# Patient Record
Sex: FEMALE | Race: WHITE | NOT HISPANIC OR LATINO
[De-identification: names, ages, dates, MRNs, and addresses within clinical notes are randomized per-mention and may not be internally consistent; named-entity substitution may affect disease eponyms.]

---

## 2017-09-20 PROBLEM — Z00.00 ENCOUNTER FOR PREVENTIVE HEALTH EXAMINATION: Status: ACTIVE | Noted: 2017-09-20

## 2017-10-16 ENCOUNTER — APPOINTMENT (OUTPATIENT)
Dept: INTERNAL MEDICINE | Facility: CLINIC | Age: 27
End: 2017-10-16

## 2018-10-09 ENCOUNTER — APPOINTMENT (OUTPATIENT)
Dept: ALLERGY | Facility: CLINIC | Age: 28
End: 2018-10-09
Payer: MEDICAID

## 2018-10-09 VITALS
SYSTOLIC BLOOD PRESSURE: 110 MMHG | HEART RATE: 80 BPM | HEIGHT: 65 IN | RESPIRATION RATE: 14 BRPM | BODY MASS INDEX: 22.49 KG/M2 | DIASTOLIC BLOOD PRESSURE: 80 MMHG | WEIGHT: 135 LBS

## 2018-10-09 DIAGNOSIS — Z87.09 PERSONAL HISTORY OF OTHER DISEASES OF THE RESPIRATORY SYSTEM: ICD-10-CM

## 2018-10-09 PROCEDURE — 95004 PERQ TESTS W/ALRGNC XTRCS: CPT

## 2018-10-09 PROCEDURE — 99070 SPECIAL SUPPLIES PHYS/QHP: CPT

## 2018-10-09 PROCEDURE — 95018 ALL TSTG PERQ&IQ DRUGS/BIOL: CPT

## 2018-10-09 PROCEDURE — 94060 EVALUATION OF WHEEZING: CPT

## 2018-10-09 PROCEDURE — 99204 OFFICE O/P NEW MOD 45 MIN: CPT | Mod: 25

## 2018-10-15 ENCOUNTER — APPOINTMENT (OUTPATIENT)
Dept: ALLERGY | Facility: CLINIC | Age: 28
End: 2018-10-15
Payer: MEDICAID

## 2018-10-15 PROCEDURE — 99214 OFFICE O/P EST MOD 30 MIN: CPT | Mod: 25

## 2018-10-15 PROCEDURE — 31231 NASAL ENDOSCOPY DX: CPT

## 2018-10-23 ENCOUNTER — APPOINTMENT (OUTPATIENT)
Dept: ALLERGY | Facility: CLINIC | Age: 28
End: 2018-10-23
Payer: MEDICAID

## 2018-10-23 VITALS
SYSTOLIC BLOOD PRESSURE: 110 MMHG | HEART RATE: 72 BPM | WEIGHT: 135 LBS | BODY MASS INDEX: 22.49 KG/M2 | RESPIRATION RATE: 4 BRPM | DIASTOLIC BLOOD PRESSURE: 80 MMHG | HEIGHT: 65 IN

## 2018-10-23 PROCEDURE — 95018 ALL TSTG PERQ&IQ DRUGS/BIOL: CPT

## 2018-10-23 PROCEDURE — 99213 OFFICE O/P EST LOW 20 MIN: CPT | Mod: 25

## 2018-10-23 PROCEDURE — 95024 IQ TESTS W/ALLERGENIC XTRCS: CPT

## 2020-12-16 PROBLEM — Z87.09 HISTORY OF ACUTE SINUSITIS: Status: RESOLVED | Noted: 2018-10-09 | Resolved: 2020-12-16

## 2022-03-15 ENCOUNTER — RESULT REVIEW (OUTPATIENT)
Age: 32
End: 2022-03-15

## 2022-03-16 DIAGNOSIS — O09.90 SUPERVISION OF HIGH RISK PREGNANCY, UNSPECIFIED, UNSPECIFIED TRIMESTER: ICD-10-CM

## 2022-03-17 ENCOUNTER — APPOINTMENT (OUTPATIENT)
Dept: ANTEPARTUM | Facility: CLINIC | Age: 32
End: 2022-03-17

## 2022-03-17 ENCOUNTER — ASOB RESULT (OUTPATIENT)
Age: 32
End: 2022-03-17

## 2022-03-17 ENCOUNTER — APPOINTMENT (OUTPATIENT)
Dept: MATERNAL FETAL MEDICINE | Facility: CLINIC | Age: 32
End: 2022-03-17

## 2022-03-17 ENCOUNTER — APPOINTMENT (OUTPATIENT)
Dept: MATERNAL FETAL MEDICINE | Facility: CLINIC | Age: 32
End: 2022-03-17
Payer: COMMERCIAL

## 2022-03-17 VITALS
DIASTOLIC BLOOD PRESSURE: 65 MMHG | OXYGEN SATURATION: 98 % | WEIGHT: 150.38 LBS | SYSTOLIC BLOOD PRESSURE: 105 MMHG | HEART RATE: 92 BPM | BODY MASS INDEX: 25.05 KG/M2 | HEIGHT: 65 IN

## 2022-03-17 PROCEDURE — 99204 OFFICE O/P NEW MOD 45 MIN: CPT

## 2022-03-22 LAB
ANA SER IF-ACNC: NEGATIVE
ANION GAP SERPL CALC-SCNC: 16 MMOL/L
BASOPHILS # BLD AUTO: 0.05 K/UL
BASOPHILS NFR BLD AUTO: 0.8 %
BUN SERPL-MCNC: 8 MG/DL
CALCIUM SERPL-MCNC: 9.6 MG/DL
CHLORIDE SERPL-SCNC: 103 MMOL/L
CO2 SERPL-SCNC: 22 MMOL/L
CREAT SERPL-MCNC: 0.6 MG/DL
DSDNA AB SER-ACNC: <12 IU/ML
EGFR: 123 ML/MIN/1.73M2
ENA SS-A AB SER IA-ACNC: <0.2 AL
ENA SS-B AB SER IA-ACNC: <0.2 AL
EOSINOPHIL # BLD AUTO: 0.16 K/UL
EOSINOPHIL NFR BLD AUTO: 2.4 %
GLUCOSE SERPL-MCNC: 65 MG/DL
HCT VFR BLD CALC: 34 %
HGB BLD-MCNC: 10.8 G/DL
IMM GRANULOCYTES NFR BLD AUTO: 0.3 %
LYMPHOCYTES # BLD AUTO: 1.65 K/UL
LYMPHOCYTES NFR BLD AUTO: 24.8 %
MAN DIFF?: NORMAL
MCHC RBC-ENTMCNC: 28.6 PG
MCHC RBC-ENTMCNC: 31.8 GM/DL
MCV RBC AUTO: 89.9 FL
MONOCYTES # BLD AUTO: 0.61 K/UL
MONOCYTES NFR BLD AUTO: 9.2 %
NEUTROPHILS # BLD AUTO: 4.16 K/UL
NEUTROPHILS NFR BLD AUTO: 62.5 %
PLATELET # BLD AUTO: 233 K/UL
POTASSIUM SERPL-SCNC: 4 MMOL/L
RBC # BLD: 3.78 M/UL
RBC # FLD: 13.2 %
RHEUMATOID FACT SER QL: <10 IU/ML
SODIUM SERPL-SCNC: 140 MMOL/L
TSI ACT/NOR SER: <0.1 IU/L
WBC # FLD AUTO: 6.65 K/UL

## 2022-04-11 LAB
ALBUMIN SERPL ELPH-MCNC: 4.3 G/DL
ALP BLD-CCNC: 71 U/L
ALT SERPL-CCNC: 9 U/L
ANION GAP SERPL CALC-SCNC: 13 MMOL/L
AST SERPL-CCNC: 12 U/L
BILIRUB SERPL-MCNC: 0.3 MG/DL
BUN SERPL-MCNC: 6 MG/DL
CALCIUM SERPL-MCNC: 9.4 MG/DL
CHLORIDE SERPL-SCNC: 102 MMOL/L
CO2 SERPL-SCNC: 21 MMOL/L
CREAT SERPL-MCNC: 0.63 MG/DL
EGFR: 122 ML/MIN/1.73M2
GLUCOSE SERPL-MCNC: 154 MG/DL
POTASSIUM SERPL-SCNC: 3.3 MMOL/L
PROT SERPL-MCNC: 6.9 G/DL
SODIUM SERPL-SCNC: 135 MMOL/L

## 2022-04-28 ENCOUNTER — APPOINTMENT (OUTPATIENT)
Dept: MATERNAL FETAL MEDICINE | Facility: CLINIC | Age: 32
End: 2022-04-28
Payer: COMMERCIAL

## 2022-04-28 ENCOUNTER — ASOB RESULT (OUTPATIENT)
Age: 32
End: 2022-04-28

## 2022-04-28 PROCEDURE — 99214 OFFICE O/P EST MOD 30 MIN: CPT | Mod: 95

## 2022-05-10 LAB
ALBUMIN SERPL ELPH-MCNC: 4.3 G/DL
ALP BLD-CCNC: 70 U/L
ALT SERPL-CCNC: 9 U/L
ANION GAP SERPL CALC-SCNC: 12 MMOL/L
AST SERPL-CCNC: 12 U/L
BILIRUB SERPL-MCNC: 0.2 MG/DL
BUN SERPL-MCNC: 9 MG/DL
CALCIUM SERPL-MCNC: 9.5 MG/DL
CHLORIDE SERPL-SCNC: 102 MMOL/L
CO2 SERPL-SCNC: 22 MMOL/L
CREAT SERPL-MCNC: 0.54 MG/DL
EGFR: 126 ML/MIN/1.73M2
GLUCOSE SERPL-MCNC: 84 MG/DL
POTASSIUM SERPL-SCNC: 3.9 MMOL/L
PROT SERPL-MCNC: 6.6 G/DL
SODIUM SERPL-SCNC: 137 MMOL/L

## 2022-06-09 ENCOUNTER — ASOB RESULT (OUTPATIENT)
Age: 32
End: 2022-06-09

## 2022-06-09 ENCOUNTER — APPOINTMENT (OUTPATIENT)
Dept: MATERNAL FETAL MEDICINE | Facility: CLINIC | Age: 32
End: 2022-06-09
Payer: COMMERCIAL

## 2022-06-09 ENCOUNTER — APPOINTMENT (OUTPATIENT)
Dept: ANTEPARTUM | Facility: CLINIC | Age: 32
End: 2022-06-09
Payer: COMMERCIAL

## 2022-06-09 VITALS
DIASTOLIC BLOOD PRESSURE: 77 MMHG | WEIGHT: 159 LBS | HEIGHT: 65 IN | SYSTOLIC BLOOD PRESSURE: 110 MMHG | BODY MASS INDEX: 26.49 KG/M2 | HEART RATE: 95 BPM

## 2022-06-09 PROCEDURE — 99213 OFFICE O/P EST LOW 20 MIN: CPT

## 2022-06-09 PROCEDURE — 76811 OB US DETAILED SNGL FETUS: CPT

## 2022-09-13 ENCOUNTER — OUTPATIENT (OUTPATIENT)
Dept: OUTPATIENT SERVICES | Facility: HOSPITAL | Age: 32
LOS: 1 days | End: 2022-09-13
Payer: COMMERCIAL

## 2022-09-13 VITALS — HEART RATE: 96 BPM | DIASTOLIC BLOOD PRESSURE: 73 MMHG | SYSTOLIC BLOOD PRESSURE: 122 MMHG

## 2022-09-13 VITALS — OXYGEN SATURATION: 97 %

## 2022-09-13 DIAGNOSIS — O26.899 OTHER SPECIFIED PREGNANCY RELATED CONDITIONS, UNSPECIFIED TRIMESTER: ICD-10-CM

## 2022-09-13 DIAGNOSIS — Z3A.00 WEEKS OF GESTATION OF PREGNANCY NOT SPECIFIED: ICD-10-CM

## 2022-09-13 LAB
APPEARANCE UR: CLEAR — SIGNIFICANT CHANGE UP
BACTERIA # UR AUTO: NEGATIVE — SIGNIFICANT CHANGE UP
BILIRUB UR-MCNC: NEGATIVE — SIGNIFICANT CHANGE UP
COLOR SPEC: SIGNIFICANT CHANGE UP
DIFF PNL FLD: NEGATIVE — SIGNIFICANT CHANGE UP
EPI CELLS # UR: 1 /HPF — SIGNIFICANT CHANGE UP
GLUCOSE UR QL: NEGATIVE — SIGNIFICANT CHANGE UP
HYALINE CASTS # UR AUTO: 0 /LPF — SIGNIFICANT CHANGE UP (ref 0–2)
KETONES UR-MCNC: NEGATIVE — SIGNIFICANT CHANGE UP
LEUKOCYTE ESTERASE UR-ACNC: NEGATIVE — SIGNIFICANT CHANGE UP
NITRITE UR-MCNC: NEGATIVE — SIGNIFICANT CHANGE UP
PH UR: 7 — SIGNIFICANT CHANGE UP (ref 5–8)
PROT UR-MCNC: NEGATIVE — SIGNIFICANT CHANGE UP
RBC CASTS # UR COMP ASSIST: 1 /HPF — SIGNIFICANT CHANGE UP (ref 0–4)
SP GR SPEC: 1.01 — LOW (ref 1.01–1.02)
UROBILINOGEN FLD QL: NEGATIVE — SIGNIFICANT CHANGE UP
WBC UR QL: 1 /HPF — SIGNIFICANT CHANGE UP (ref 0–5)

## 2022-09-13 PROCEDURE — 87086 URINE CULTURE/COLONY COUNT: CPT

## 2022-09-13 PROCEDURE — 81001 URINALYSIS AUTO W/SCOPE: CPT

## 2022-09-13 PROCEDURE — G0463: CPT

## 2022-09-13 PROCEDURE — 59025 FETAL NON-STRESS TEST: CPT

## 2022-09-13 NOTE — OB RN TRIAGE NOTE - FALL HARM RISK - UNIVERSAL INTERVENTIONS
Bed in lowest position, wheels locked, appropriate side rails in place/Call bell, personal items and telephone in reach/Instruct patient to call for assistance before getting out of bed or chair/Non-slip footwear when patient is out of bed/[place] to call system/Physically safe environment - no spills, clutter or unnecessary equipment/Purposeful Proactive Rounding/Room/bathroom lighting operational, light cord in reach

## 2022-09-13 NOTE — OB PROVIDER TRIAGE NOTE - ADDITIONAL INSTRUCTIONS
The patient was instructed to contact her MD for consistent contractions with increasing pain.  Additionally, the patient was instructed to contact her MD for leakage of fluids, vaginal bleeding, or decreased fetal movement. The patient has an appt with Dr. Gomez on 9/15. The patient was seen and evaluated by Dr. Gomez

## 2022-09-13 NOTE — CHART NOTE - NSCHARTNOTEFT_GEN_A_CORE
NP Chart Note:    The patient was re-examined after 3 hrs and found to be closed/long/-3. The patient reports continued cramping pain with no change in intensity. The urinalysis is negative for a UTI. Urine culture is pending. The patient to be discharged with labor precautions.     d/w Dr. Jason Lee NP

## 2022-09-13 NOTE — OB RN TRIAGE NOTE - FALL HARM RISK - TYPE OF ASSESSMENT
Thank you. Yes, it was me that called her at [phone], but no one answered and voice mail had not yet been set up on the account. Daily Assessment

## 2022-09-13 NOTE — OB PROVIDER TRIAGE NOTE - NS ATTEND AMEND GEN_ALL_CORE FT
patient discussed with PA and seen at bedside. 34.2 w overall benign prenatal course here for irregular ctx. pt appears comfortable in bed, feels ctx have improved. continues to hydrate. exam C/L/P x 2 over 3+hrs with no change. ctx improving. comfortable. dc home with precautions. f/u as scheduled 9/15.

## 2022-09-13 NOTE — OB PROVIDER TRIAGE NOTE - NSHPPHYSICALEXAM_GEN_ALL_CORE
ICU Vital Signs Last 24 Hrs  T(C): 36.8 (13 Sep 2022 10:11), Max: 36.8 (13 Sep 2022 10:11)  T(F): 98.24 (13 Sep 2022 10:11), Max: 98.24 (13 Sep 2022 10:11)  HR: 80 (13 Sep 2022 11:19) (76 - 86)  BP: 107/67 (13 Sep 2022 10:53) (107/67 - 107/67)  BP(mean): --  ABP: --  ABP(mean): --  RR: 18 (13 Sep 2022 10:53) (16 - 18)  SpO2: 99% (13 Sep 2022 11:19) (97% - 100%)    gen: A&Ox3  CV: rrr s1s2  abd: gravid soft  VE: closed/long/-3 intact  EFM: 125 moderate variability, + acels, -decels  toco: none ICU Vital Signs Last 24 Hrs  T(C): 36.8 (13 Sep 2022 10:11), Max: 36.8 (13 Sep 2022 10:11)  T(F): 98.24 (13 Sep 2022 10:11), Max: 98.24 (13 Sep 2022 10:11)  HR: 80 (13 Sep 2022 11:19) (76 - 86)  BP: 107/67 (13 Sep 2022 10:53) (107/67 - 107/67)  BP(mean): --  ABP: --  ABP(mean): --  RR: 18 (13 Sep 2022 10:53) (16 - 18)  SpO2: 99% (13 Sep 2022 11:19) (97% - 100%)    gen: A&Ox3  CV: rrr s1s2  abd: gravid soft  VE: closed/long/-3 intact  EFM: 125 moderate variability, + acels, -decels  toco: none, however toco adjusted ICU Vital Signs Last 24 Hrs  T(C): 36.8 (13 Sep 2022 10:11), Max: 36.8 (13 Sep 2022 10:11)  T(F): 98.24 (13 Sep 2022 10:11), Max: 98.24 (13 Sep 2022 10:11)  HR: 80 (13 Sep 2022 11:19) (76 - 86)  BP: 107/67 (13 Sep 2022 10:53) (107/67 - 107/67)  BP(mean): --  ABP: --  ABP(mean): --  RR: 18 (13 Sep 2022 10:53) (16 - 18)  SpO2: 99% (13 Sep 2022 11:19) (97% - 100%)    gen: A&Ox3  CV: rrr s1s2  abd: gravid soft  VE: closed/long/-3 intact  EFM: 125 moderate variability, + acels, -decels  toco: none, however toco adjusted  bedside sonogram: cephalic presentation

## 2022-09-13 NOTE — OB PROVIDER TRIAGE NOTE - HISTORY OF PRESENT ILLNESS
The patient is a 33 y/o  EDC 10/23/2022 @ 34.2 weeks presenting to L&D with cramping pain [id number] overnight, however the patient reports intermittent cramping pain x1wk. In addition to cramping pain, the patient reports sharp cervical pain 5x overnight. The patient denies LOF, VB. endorses good fetal movement. The patient accepts all blood products    allergies: nkda  meds: PNV    Medhx: pt denies cardiac, pulm, heme, GI, neuro  OBhx: current  Sxhx: pt denies  Gynhx: +HSV no current outbreaks, denies cysts, fibroids, abn. pap  Sochx: pt denies  Famhx: pt denies

## 2022-09-13 NOTE — OB PROVIDER TRIAGE NOTE - NSOBPROVIDERNOTE_OBGYN_ALL_OB_FT
33 y/o  @ 34.2 weeks presenting to L&D with cramping pain likely Glasscock-Anurag  -EFM/toco  -U/a and C&S    d/w Dr. Jason Lee NP 33 y/o  @ 34.2 weeks presenting to L&D with cramping pain likely Lamb-Osborn  -EFM/toco  -U/a and C&S  -PO hydration  -repeat VE 2 hrs from previous exam    d/w Dr. Jason Lee NP

## 2022-09-14 LAB
CULTURE RESULTS: SIGNIFICANT CHANGE UP
SPECIMEN SOURCE: SIGNIFICANT CHANGE UP

## 2022-09-21 ENCOUNTER — OUTPATIENT (OUTPATIENT)
Dept: OUTPATIENT SERVICES | Facility: HOSPITAL | Age: 32
LOS: 1 days | End: 2022-09-21
Payer: COMMERCIAL

## 2022-09-21 VITALS — TEMPERATURE: 98 F

## 2022-09-21 VITALS — SYSTOLIC BLOOD PRESSURE: 118 MMHG | DIASTOLIC BLOOD PRESSURE: 69 MMHG | HEART RATE: 73 BPM

## 2022-09-21 DIAGNOSIS — Z3A.00 WEEKS OF GESTATION OF PREGNANCY NOT SPECIFIED: ICD-10-CM

## 2022-09-21 DIAGNOSIS — O26.899 OTHER SPECIFIED PREGNANCY RELATED CONDITIONS, UNSPECIFIED TRIMESTER: ICD-10-CM

## 2022-09-21 LAB
APTT BLD: 26.6 SEC — LOW (ref 27.5–35.5)
BLD GP AB SCN SERPL QL: POSITIVE — SIGNIFICANT CHANGE UP
FIBRINOGEN PPP-MCNC: 678 MG/DL — HIGH (ref 330–520)
HCT VFR BLD CALC: 36 % — SIGNIFICANT CHANGE UP (ref 34.5–45)
HGB BLD-MCNC: 11.8 G/DL — SIGNIFICANT CHANGE UP (ref 11.5–15.5)
INR BLD: 1.01 RATIO — SIGNIFICANT CHANGE UP (ref 0.88–1.16)
KLEIHAUER-BETKE CALCULATION: 0 % — SIGNIFICANT CHANGE UP (ref 0–0.3)
MCHC RBC-ENTMCNC: 27.7 PG — SIGNIFICANT CHANGE UP (ref 27–34)
MCHC RBC-ENTMCNC: 32.8 GM/DL — SIGNIFICANT CHANGE UP (ref 32–36)
MCV RBC AUTO: 84.5 FL — SIGNIFICANT CHANGE UP (ref 80–100)
NRBC # BLD: 0 /100 WBCS — SIGNIFICANT CHANGE UP (ref 0–0)
PLATELET # BLD AUTO: 198 K/UL — SIGNIFICANT CHANGE UP (ref 150–400)
PROTHROM AB SERPL-ACNC: 11.7 SEC — SIGNIFICANT CHANGE UP (ref 10.5–13.4)
RBC # BLD: 4.26 M/UL — SIGNIFICANT CHANGE UP (ref 3.8–5.2)
RBC # FLD: 14.4 % — SIGNIFICANT CHANGE UP (ref 10.3–14.5)
RH IG SCN BLD-IMP: NEGATIVE — SIGNIFICANT CHANGE UP
RH IG SCN BLD-IMP: NEGATIVE — SIGNIFICANT CHANGE UP
WBC # BLD: 10.53 K/UL — HIGH (ref 3.8–10.5)
WBC # FLD AUTO: 10.53 K/UL — HIGH (ref 3.8–10.5)

## 2022-09-21 PROCEDURE — 85730 THROMBOPLASTIN TIME PARTIAL: CPT

## 2022-09-21 PROCEDURE — 86870 RBC ANTIBODY IDENTIFICATION: CPT

## 2022-09-21 PROCEDURE — G0463: CPT

## 2022-09-21 PROCEDURE — 85610 PROTHROMBIN TIME: CPT

## 2022-09-21 PROCEDURE — 85384 FIBRINOGEN ACTIVITY: CPT

## 2022-09-21 PROCEDURE — 59025 FETAL NON-STRESS TEST: CPT

## 2022-09-21 PROCEDURE — 85460 HEMOGLOBIN FETAL: CPT

## 2022-09-21 PROCEDURE — 86850 RBC ANTIBODY SCREEN: CPT

## 2022-09-21 PROCEDURE — 86077 PHYS BLOOD BANK SERV XMATCH: CPT

## 2022-09-21 PROCEDURE — 86901 BLOOD TYPING SEROLOGIC RH(D): CPT

## 2022-09-21 PROCEDURE — 85027 COMPLETE CBC AUTOMATED: CPT

## 2022-09-21 PROCEDURE — 36415 COLL VENOUS BLD VENIPUNCTURE: CPT

## 2022-09-21 PROCEDURE — 86900 BLOOD TYPING SEROLOGIC ABO: CPT

## 2022-09-21 RX ORDER — SODIUM CHLORIDE 9 MG/ML
1000 INJECTION, SOLUTION INTRAVENOUS
Refills: 0 | Status: DISCONTINUED | OUTPATIENT
Start: 2022-09-21 | End: 2022-10-05
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
## 2022-09-21 NOTE — OB PROVIDER TRIAGE NOTE - NSHPPHYSICALEXAM_GEN_ALL_CORE
Vitals:  Vital Signs Last 24 Hrs  T(C): 36.8 (21 Sep 2022 11:08), Max: 36.8 (21 Sep 2022 11:01)  T(F): 98.2 (21 Sep 2022 11:08), Max: 98.24 (21 Sep 2022 11:01)  HR: 88 (21 Sep 2022 11:48) (80 - 97)  BP: 115/55 (21 Sep 2022 11:08) (115/55 - 115/55)  BP(mean): --  RR: 16 (21 Sep 2022 11:08) (16 - 16)  SpO2: 97% (21 Sep 2022 11:48) (94% - 98%)    PE  General: WDWN gravid female in NAD  CV: RRR  Resp: breathing comfortably on room air  Abdomen: soft, nontender, gravid, no erythema or bruising noted  SVE: closed/thick/high, no bleeding noted  [place]: not hilary, irritability noted  FHT: 135bpm, mod variability, +accel, -decel
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
## 2022-09-21 NOTE — OB RN TRIAGE NOTE - FALL HARM RISK - UNIVERSAL INTERVENTIONS
Bed in lowest position, wheels locked, appropriate side rails in place/Call bell, personal items and telephone in reach/Instruct patient to call for assistance before getting out of bed or chair/Non-slip footwear when patient is out of bed/[place] to call system/Physically safe environment - no spills, clutter or unnecessary equipment/Purposeful Proactive Rounding/Room/bathroom lighting operational, light cord in reach

## 2022-10-27 ENCOUNTER — INPATIENT (INPATIENT)
Facility: HOSPITAL | Age: 32
LOS: 2 days | Discharge: HOME | End: 2022-10-30
Attending: OBSTETRICS & GYNECOLOGY | Admitting: OBSTETRICS & GYNECOLOGY
Payer: COMMERCIAL

## 2022-10-27 ENCOUNTER — OUTPATIENT (OUTPATIENT)
Dept: OUTPATIENT SERVICES | Facility: HOSPITAL | Age: 32
LOS: 1 days | End: 2022-10-27
Payer: COMMERCIAL

## 2022-10-27 VITALS
HEART RATE: 82 BPM | SYSTOLIC BLOOD PRESSURE: 123 MMHG | HEIGHT: 65 IN | WEIGHT: 188.94 LBS | RESPIRATION RATE: 16 BRPM | DIASTOLIC BLOOD PRESSURE: 87 MMHG | OXYGEN SATURATION: 100 % | TEMPERATURE: 98 F

## 2022-10-27 DIAGNOSIS — Z11.52 ENCOUNTER FOR SCREENING FOR COVID-19: ICD-10-CM

## 2022-10-27 PROBLEM — Z86.39 PERSONAL HISTORY OF OTHER ENDOCRINE, NUTRITIONAL AND METABOLIC DISEASE: Chronic | Status: ACTIVE | Noted: 2022-09-21

## 2022-10-27 PROBLEM — B00.9 HERPESVIRAL INFECTION, UNSPECIFIED: Chronic | Status: ACTIVE | Noted: 2022-09-21

## 2022-10-27 LAB
BASOPHILS # BLD AUTO: 0.06 K/UL — SIGNIFICANT CHANGE UP (ref 0–0.2)
BASOPHILS NFR BLD AUTO: 0.6 % — SIGNIFICANT CHANGE UP (ref 0–2)
BLD GP AB SCN SERPL QL: POSITIVE — SIGNIFICANT CHANGE UP
COVID-19 SPIKE DOMAIN AB INTERP: POSITIVE
COVID-19 SPIKE DOMAIN ANTIBODY RESULT: >250 U/ML — HIGH
EOSINOPHIL # BLD AUTO: 0.15 K/UL — SIGNIFICANT CHANGE UP (ref 0–0.5)
EOSINOPHIL NFR BLD AUTO: 1.6 % — SIGNIFICANT CHANGE UP (ref 0–6)
HCT VFR BLD CALC: 36.9 % — SIGNIFICANT CHANGE UP (ref 34.5–45)
HGB BLD-MCNC: 12.2 G/DL — SIGNIFICANT CHANGE UP (ref 11.5–15.5)
IMM GRANULOCYTES NFR BLD AUTO: 0.6 % — SIGNIFICANT CHANGE UP (ref 0–0.9)
LYMPHOCYTES # BLD AUTO: 1.14 K/UL — SIGNIFICANT CHANGE UP (ref 1–3.3)
LYMPHOCYTES # BLD AUTO: 12 % — LOW (ref 13–44)
MCHC RBC-ENTMCNC: 28.4 PG — SIGNIFICANT CHANGE UP (ref 27–34)
MCHC RBC-ENTMCNC: 33.1 GM/DL — SIGNIFICANT CHANGE UP (ref 32–36)
MCV RBC AUTO: 86 FL — SIGNIFICANT CHANGE UP (ref 80–100)
MONOCYTES # BLD AUTO: 0.65 K/UL — SIGNIFICANT CHANGE UP (ref 0–0.9)
MONOCYTES NFR BLD AUTO: 6.9 % — SIGNIFICANT CHANGE UP (ref 2–14)
NEUTROPHILS # BLD AUTO: 7.41 K/UL — HIGH (ref 1.8–7.4)
NEUTROPHILS NFR BLD AUTO: 78.3 % — HIGH (ref 43–77)
NRBC # BLD: 0 /100 WBCS — SIGNIFICANT CHANGE UP (ref 0–0)
PLATELET # BLD AUTO: 208 K/UL — SIGNIFICANT CHANGE UP (ref 150–400)
RBC # BLD: 4.29 M/UL — SIGNIFICANT CHANGE UP (ref 3.8–5.2)
RBC # FLD: 15.6 % — HIGH (ref 10.3–14.5)
RH IG SCN BLD-IMP: NEGATIVE — SIGNIFICANT CHANGE UP
SARS-COV-2 IGG+IGM SERPL QL IA: >250 U/ML — HIGH
SARS-COV-2 IGG+IGM SERPL QL IA: POSITIVE
SARS-COV-2 RNA SPEC QL NAA+PROBE: SIGNIFICANT CHANGE UP
WBC # BLD: 9.47 K/UL — SIGNIFICANT CHANGE UP (ref 3.8–10.5)
WBC # FLD AUTO: 9.47 K/UL — SIGNIFICANT CHANGE UP (ref 3.8–10.5)

## 2022-10-27 PROCEDURE — C9803: CPT

## 2022-10-27 PROCEDURE — 86077 PHYS BLOOD BANK SERV XMATCH: CPT

## 2022-10-27 PROCEDURE — U0003: CPT

## 2022-10-27 PROCEDURE — U0005: CPT

## 2022-10-27 RX ORDER — CHLORHEXIDINE GLUCONATE 213 G/1000ML
1 SOLUTION TOPICAL ONCE
Refills: 0 | Status: DISCONTINUED | OUTPATIENT
Start: 2022-10-27 | End: 2022-10-28

## 2022-10-27 RX ORDER — OXYTOCIN 10 UNIT/ML
333.33 VIAL (ML) INJECTION
Qty: 20 | Refills: 0 | Status: DISCONTINUED | OUTPATIENT
Start: 2022-10-27 | End: 2022-10-30

## 2022-10-27 RX ORDER — CITRIC ACID/SODIUM CITRATE 300-500 MG
15 SOLUTION, ORAL ORAL EVERY 6 HOURS
Refills: 0 | Status: DISCONTINUED | OUTPATIENT
Start: 2022-10-27 | End: 2022-10-28

## 2022-10-27 RX ORDER — SODIUM CHLORIDE 9 MG/ML
1000 INJECTION, SOLUTION INTRAVENOUS
Refills: 0 | Status: DISCONTINUED | OUTPATIENT
Start: 2022-10-27 | End: 2022-10-28
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
## 2022-10-27 NOTE — OB RN PATIENT PROFILE - FALL HARM RISK - UNIVERSAL INTERVENTIONS
Bed in lowest position, wheels locked, appropriate side rails in place/Call bell, personal items and telephone in reach/Instruct patient to call for assistance before getting out of bed or chair/Non-slip footwear when patient is out of bed/[place] to call system/Physically safe environment - no spills, clutter or unnecessary equipment/Purposeful Proactive Rounding/Room/bathroom lighting operational, light cord in reach

## 2022-10-27 NOTE — OB PROVIDER H&P - ASSESSMENT
Assessment  33yo  at 40w4d presents for NRNST and IOL.     Plan  - Admit to LND. Routine Labs. IVF.  - IOL w/ buccal cytotec  - Fetus: reassuring FHT. VTX. EFW 4026g by sono. Continuous EFM. Sono. No concerns.  - Prenatal issues: none  - GBS neg  - Pain: epidural PRN    Seen and d/w Dr. PATRICK Alcala, PGY3  Mariela Hitchcock, MS3 Assessment  31yo  at 40w4d presents for NRNST and IOL.     Plan  - Admit to LND. Routine Labs. IVF.  - IOL w/ buccal cytotec  - Fetus: reassuring FHT. VTX. EFW 4026g by sono. Continuous EFM. Sono. No concerns.  - Prenatal issues: none  - GBS neg  - Pain: epidural PRN    D/W Dr. Santo  Seen and d/w Dr. PATRICK Alcala, PGY3  Mariela Hitchcock, MS3

## 2022-10-27 NOTE — OB PROVIDER H&P - NSHPPHYSICALEXAM_GEN_ALL_CORE
Objective  VS  T(C): 36.5 (10-27-22 @ 17:44)  HR: 92 (10-27-22 @ 18:02)  BP: 123/87 (10-27-22 @ 17:44)  RR: 16 (10-27-22 @ 17:44)  SpO2: 88% (10-27-22 @ 18:02)    PE:   CV: clinically well perfused  Pulm: breathing comfortably on RA  Abd: gravid, nontender  Extr: moving all extremities with ease     Spec: neg. lesions  VE: 0/50/-3    FHT: baseline 120, mod variability, +accels, -decels  [place]: irritability  Sono: vertex Objective  VS  T(C): 36.5 (10-27-22 @ 17:44)  HR: 92 (10-27-22 @ 18:02)  BP: 123/87 (10-27-22 @ 17:44)  RR: 16 (10-27-22 @ 17:44)  SpO2: 88% (10-27-22 @ 18:02)    PE:   CV: clinically well perfused  Pulm: breathing comfortably on RA  Abd: gravid, nontender  Extr: moving all extremities with ease  SVE: 0/0/-3  SSE: no lesions     Spec: neg. lesions  VE: 0/50/-3    FHT: baseline 120, mod variability, +accels, -decels  [place]: irritability  Sono: vertex

## 2022-10-28 ENCOUNTER — TRANSCRIPTION ENCOUNTER (OUTPATIENT)
Age: 32
End: 2022-10-28

## 2022-10-28 LAB — T PALLIDUM AB TITR SER: NEGATIVE — SIGNIFICANT CHANGE UP

## 2022-10-28 PROCEDURE — 88307 TISSUE EXAM BY PATHOLOGIST: CPT | Mod: 26

## 2022-10-28 RX ORDER — OXYCODONE HYDROCHLORIDE 5 MG/1
5 TABLET ORAL ONCE
Refills: 0 | Status: DISCONTINUED | OUTPATIENT
Start: 2022-10-28 | End: 2022-10-30

## 2022-10-28 RX ORDER — IBUPROFEN 200 MG
600 TABLET ORAL EVERY 6 HOURS
Refills: 0 | Status: COMPLETED | OUTPATIENT
Start: 2022-10-28 | End: 2023-09-26

## 2022-10-28 RX ORDER — SODIUM CHLORIDE 9 MG/ML
3 INJECTION INTRAMUSCULAR; INTRAVENOUS; SUBCUTANEOUS EVERY 8 HOURS
Refills: 0 | Status: DISCONTINUED | OUTPATIENT
Start: 2022-10-28 | End: 2022-10-30

## 2022-10-28 RX ORDER — DIPHENHYDRAMINE HCL 50 MG
25 CAPSULE ORAL EVERY 6 HOURS
Refills: 0 | Status: DISCONTINUED | OUTPATIENT
Start: 2022-10-28 | End: 2022-10-30

## 2022-10-28 RX ORDER — OXYTOCIN 10 UNIT/ML
4 VIAL (ML) INJECTION
Qty: 30 | Refills: 0 | Status: DISCONTINUED | OUTPATIENT
Start: 2022-10-28 | End: 2022-10-28

## 2022-10-28 RX ORDER — BENZOCAINE 10 %
1 GEL (GRAM) MUCOUS MEMBRANE EVERY 6 HOURS
Refills: 0 | Status: DISCONTINUED | OUTPATIENT
Start: 2022-10-28 | End: 2022-10-30

## 2022-10-28 RX ORDER — KETOROLAC TROMETHAMINE 30 MG/ML
30 SYRINGE (ML) INJECTION ONCE
Refills: 0 | Status: DISCONTINUED | OUTPATIENT
Start: 2022-10-28 | End: 2022-10-29

## 2022-10-28 RX ORDER — DIBUCAINE 1 %
1 OINTMENT (GRAM) RECTAL EVERY 6 HOURS
Refills: 0 | Status: DISCONTINUED | OUTPATIENT
Start: 2022-10-28 | End: 2022-10-30

## 2022-10-28 RX ORDER — CEFAZOLIN SODIUM 1 G
2000 VIAL (EA) INJECTION ONCE
Refills: 0 | Status: COMPLETED | OUTPATIENT
Start: 2022-10-28 | End: 2022-10-29

## 2022-10-28 RX ORDER — PRAMOXINE HYDROCHLORIDE 150 MG/15G
1 AEROSOL, FOAM RECTAL EVERY 4 HOURS
Refills: 0 | Status: DISCONTINUED | OUTPATIENT
Start: 2022-10-28 | End: 2022-10-30

## 2022-10-28 RX ORDER — MAGNESIUM HYDROXIDE 400 MG/1
30 TABLET, CHEWABLE ORAL
Refills: 0 | Status: DISCONTINUED | OUTPATIENT
Start: 2022-10-28 | End: 2022-10-30

## 2022-10-28 RX ORDER — OXYTOCIN 10 UNIT/ML
333.33 VIAL (ML) INJECTION
Qty: 20 | Refills: 0 | Status: DISCONTINUED | OUTPATIENT
Start: 2022-10-28 | End: 2022-10-30

## 2022-10-28 RX ORDER — SODIUM CHLORIDE 9 MG/ML
1000 INJECTION INTRAMUSCULAR; INTRAVENOUS; SUBCUTANEOUS
Refills: 0 | Status: DISCONTINUED | OUTPATIENT
Start: 2022-10-28 | End: 2022-10-30

## 2022-10-28 RX ORDER — ACETAMINOPHEN 500 MG
975 TABLET ORAL
Refills: 0 | Status: DISCONTINUED | OUTPATIENT
Start: 2022-10-28 | End: 2022-10-30

## 2022-10-28 RX ORDER — TETANUS TOXOID, REDUCED DIPHTHERIA TOXOID AND ACELLULAR PERTUSSIS VACCINE, ADSORBED 5; 2.5; 8; 8; 2.5 [IU]/.5ML; [IU]/.5ML; UG/.5ML; UG/.5ML; UG/.5ML
0.5 SUSPENSION INTRAMUSCULAR ONCE
Refills: 0 | Status: DISCONTINUED | OUTPATIENT
Start: 2022-10-28 | End: 2022-10-30

## 2022-10-28 RX ORDER — LANOLIN
1 OINTMENT (GRAM) TOPICAL EVERY 6 HOURS
Refills: 0 | Status: DISCONTINUED | OUTPATIENT
Start: 2022-10-28 | End: 2022-10-30

## 2022-10-28 RX ORDER — SODIUM CHLORIDE 9 MG/ML
300 INJECTION INTRAMUSCULAR; INTRAVENOUS; SUBCUTANEOUS ONCE
Refills: 0 | Status: COMPLETED | OUTPATIENT
Start: 2022-10-28 | End: 2022-10-28

## 2022-10-28 RX ORDER — HYDROCORTISONE 1 %
1 OINTMENT (GRAM) TOPICAL EVERY 6 HOURS
Refills: 0 | Status: DISCONTINUED | OUTPATIENT
Start: 2022-10-28 | End: 2022-10-30

## 2022-10-28 RX ORDER — AER TRAVELER 0.5 G/1
1 SOLUTION RECTAL; TOPICAL EVERY 4 HOURS
Refills: 0 | Status: DISCONTINUED | OUTPATIENT
Start: 2022-10-28 | End: 2022-10-30

## 2022-10-28 RX ORDER — OXYCODONE HYDROCHLORIDE 5 MG/1
5 TABLET ORAL
Refills: 0 | Status: DISCONTINUED | OUTPATIENT
Start: 2022-10-28 | End: 2022-10-30

## 2022-10-28 RX ORDER — SIMETHICONE 80 MG/1
80 TABLET, CHEWABLE ORAL EVERY 4 HOURS
Refills: 0 | Status: DISCONTINUED | OUTPATIENT
Start: 2022-10-28 | End: 2022-10-30

## 2022-10-28 RX ORDER — ONDANSETRON 8 MG/1
4 TABLET, FILM COATED ORAL ONCE
Refills: 0 | Status: COMPLETED | OUTPATIENT
Start: 2022-10-28 | End: 2022-10-28

## 2022-10-28 RX ADMIN — Medication 1000 MILLIUNIT(S)/MIN: at 23:50

## 2022-10-28 RX ADMIN — SODIUM CHLORIDE 125 MILLILITER(S): 9 INJECTION INTRAMUSCULAR; INTRAVENOUS; SUBCUTANEOUS at 15:09

## 2022-10-28 RX ADMIN — Medication 4 MILLIUNIT(S)/MIN: at 04:29

## 2022-10-28 RX ADMIN — SODIUM CHLORIDE 600 MILLILITER(S): 9 INJECTION INTRAMUSCULAR; INTRAVENOUS; SUBCUTANEOUS at 14:38

## 2022-10-28 RX ADMIN — ONDANSETRON 4 MILLIGRAM(S): 8 TABLET, FILM COATED ORAL at 20:09

## 2022-10-28 NOTE — OB PROVIDER LABOR PROGRESS NOTE - NS_ADDCERVICALEXAM_OBGYN_ALL_OB
Click to add…

## 2022-10-28 NOTE — OB PROVIDER LABOR PROGRESS NOTE - NS_OBIHICONTRACTIONPATTERNDETAILS_OBGYN_ALL_OB_FT
IRREG Q5-7
Q2
q 3min iupc - pitocin on
iupc q 3min   pit still at 10mu  not adequate consistently
2-4
Q2
q 3min (pit at 8mu) IUPC
q4m
ipuc 2-3
Q2-3

## 2022-10-28 NOTE — OB PROVIDER LABOR PROGRESS NOTE - NS_SUBJECTIVE/OBJECTIVE_OBGYN_ALL_OB_FT
pt with pain and urge to push
pt feeling some pressure
pt s/p bc, cf. ON PITOCIN NOW. WANTS EPI
pt feeling urge to push
PT DOING WELL. FEELS COMFORTABLE AFTER EPI TOP OFF
PT S/P AMNIO INFUSION & PITOCIN WAS PAUSED 2 TO VARIABLES. PITOCIN RESTARTED BUT NOW ONLY ON 4 MU.
OB PA Progress Note    Pt seen and evaluated for placement of cervical balloon.   CB placed with 60cc x2. Pt tolerated the procedure well.     Exam  VSS  SVE: 1/50/-3  EFM: Cat I  [place]: irregular
PT S/P EPI. FEELS COMFORTABLE.
Pt comfortable, no complaints. Cook balloon out.
Pt seen and examined for recurrent variable decelerations
pt  pushing

## 2022-10-28 NOTE — OB PROVIDER LABOR PROGRESS NOTE - NS_OBIHIFHRDETAILS_OBGYN_ALL_OB_FT
145, mod variability + q accels, variables
145 mod variability + q accels no decels
ALT B/N CATEGORY 1 & 2.
s/p prolonged decel to 120 after SVE  back to baseline spontaneously
130/Mod  + recurrent variable decels  cat2
135/ mod sandy/ (-) accel/ (-) decel
145 mod variability  occ variables
CATEGORY 1.
CATEGORY 1
CATEGORY 1

## 2022-10-28 NOTE — OB PROVIDER LABOR PROGRESS NOTE - NSVAGINALEXAM_OBGYN_ALL_OB_DT
28-Oct-2022 16:00
28-Oct-2022 01:37
28-Oct-2022 11:33
28-Oct-2022 13:45
28-Oct-2022 14:27
28-Oct-2022 19:45
28-Oct-2022 22:48
28-Oct-2022 12:50
28-Oct-2022 20:32
28-Oct-2022 21:44

## 2022-10-28 NOTE — PRE-ANESTHESIA EVALUATION ADULT - NSANTHOSAYNRD_GEN_A_CORE
No. BENJI screening performed.  STOP BANG Legend: 0-2 = LOW Risk; 3-4 = INTERMEDIATE Risk; 5-8 = HIGH Risk
No. BENJI screening performed.  STOP BANG Legend: 0-2 = LOW Risk; 3-4 = INTERMEDIATE Risk; 5-8 = HIGH Risk

## 2022-10-28 NOTE — OB PROVIDER LABOR PROGRESS NOTE - ASSESSMENT
31yo  at 40+5 IOL for NRFHT, now with recurrent variables that did not resolve with repositioning  - IUPC placed, clear flash visualized  - Start amnioinfusion    D/w Dr. Galilea Hussein, PGY2  
31yo  at 40w4d iol for NRFHT  Continue with Pitocin for IOL  Will update Dr. Calero
A/P:  - CB in place  - BC#3@130a  - for pitocin @4a  - epiPRN  - Dr. Gal Nieves PA-C
AROM- + MEC. PT ON 24U PITOCIN. WILL LOWER PITOCIN. LABOR PROGRESS DISCUSSED.   J LESLIEARO
a/p P0 progressing in 2nd stage of labor  cat2 reassuring with normal baseline and mod variability  cont iupc/amininfusion  pitocin as tolerated  cont epidural    Lana Escudero MD
assuming care of patient from Dr. Calero  in brief P0 at 40+weeks for IOL for cat 2 tracing  reviewed tracing with Dr. Calero - was cat 2 now cat 1 reassuring reactive  cont iupc and efm  cont epidural  cont pit as needed tolerated  per signout no HSV lesions - patient had denied symptoms  leopolds for me 8lb 8oz      Lana Escudero MD
PITOCIN AT 10 MU. PT SITTING IN THRONE POSITION. WILL ALLOW TO LABOR DOWN. POSITION ROT TO DEANNA GREWAL
a/p P0 still in active phase  on my exam = without a ctx the cervix is only 9cm with caput noted, OP position  all findings reviewed with patient and her family  top off given  will increase pit if no additional decels - likely occurred post SVE and vasal stimulation  cont iupc and efm  cont epidural    Lana Escudero MD  
PT MAKING LITTLE PROGRESS SINCE 1130 BUT NOW IS NOT IN ACTIVE LABOR. REC WE INC PITOCIN. IF FETUS DOES NOT TOLERATE ACTIVE LABOR REC WE PROCEED TO C/S AS PT HAS A WHILE TO GO. LIKEWISE IF FHR REMAINS CATEGORY 1 & TOLERATES ADEQUATE LABOR BUT STILL DOES NOT MAKE PROGRESS MAY NEED C/S FOR LGA. PT &  UNDERSTANDS.     ELENA GREWAL
PT WANTS EPI. ANESTHESIA CALLED. PT WANTS ME TO WAIT UNTIL AFTER EPI FOR AROM. PT STATES EFW=8-14 ON 10/25/22.    ELENA GREWAL
a/p P0 with cervical change to anterior lip  cont pit as tolerated   cont ipuc/amnioinfusion   cat 2 overall reassuring  cont epidural  will start pushing when FD and with stronger urge     Lana Escudero MD

## 2022-10-29 LAB
ALBUMIN SERPL ELPH-MCNC: 3.2 G/DL — LOW (ref 3.3–5)
ALP SERPL-CCNC: 141 U/L — HIGH (ref 40–120)
ALT FLD-CCNC: 43 U/L — SIGNIFICANT CHANGE UP (ref 10–45)
ANION GAP SERPL CALC-SCNC: 14 MMOL/L — SIGNIFICANT CHANGE UP (ref 5–17)
APTT BLD: 25.6 SEC — LOW (ref 27.5–35.5)
AST SERPL-CCNC: 63 U/L — HIGH (ref 10–40)
BASOPHILS # BLD AUTO: 0.05 K/UL — SIGNIFICANT CHANGE UP (ref 0–0.2)
BASOPHILS NFR BLD AUTO: 0.3 % — SIGNIFICANT CHANGE UP (ref 0–2)
BILIRUB SERPL-MCNC: 0.6 MG/DL — SIGNIFICANT CHANGE UP (ref 0.2–1.2)
BUN SERPL-MCNC: 7 MG/DL — SIGNIFICANT CHANGE UP (ref 7–23)
CALCIUM SERPL-MCNC: 8.8 MG/DL — SIGNIFICANT CHANGE UP (ref 8.4–10.5)
CHLORIDE SERPL-SCNC: 103 MMOL/L — SIGNIFICANT CHANGE UP (ref 96–108)
CO2 SERPL-SCNC: 21 MMOL/L — LOW (ref 22–31)
CREAT SERPL-MCNC: 0.82 MG/DL — SIGNIFICANT CHANGE UP (ref 0.5–1.3)
EGFR: 98 ML/MIN/1.73M2 — SIGNIFICANT CHANGE UP
EOSINOPHIL # BLD AUTO: 0.02 K/UL — SIGNIFICANT CHANGE UP (ref 0–0.5)
EOSINOPHIL NFR BLD AUTO: 0.1 % — SIGNIFICANT CHANGE UP (ref 0–6)
FIBRINOGEN PPP-MCNC: 880 MG/DL — HIGH (ref 330–520)
GLUCOSE SERPL-MCNC: 100 MG/DL — HIGH (ref 70–99)
HCT VFR BLD CALC: 37.6 % — SIGNIFICANT CHANGE UP (ref 34.5–45)
HGB BLD-MCNC: 12.5 G/DL — SIGNIFICANT CHANGE UP (ref 11.5–15.5)
IMM GRANULOCYTES NFR BLD AUTO: 0.7 % — SIGNIFICANT CHANGE UP (ref 0–0.9)
INR BLD: 1 RATIO — SIGNIFICANT CHANGE UP (ref 0.88–1.16)
KLEIHAUER-BETKE CALCULATION: 0 % — SIGNIFICANT CHANGE UP (ref 0–0.3)
LDH SERPL L TO P-CCNC: 366 U/L — HIGH (ref 50–242)
LYMPHOCYTES # BLD AUTO: 0.55 K/UL — LOW (ref 1–3.3)
LYMPHOCYTES # BLD AUTO: 3.1 % — LOW (ref 13–44)
MAGNESIUM SERPL-MCNC: 4.7 MG/DL — HIGH (ref 1.6–2.6)
MAGNESIUM SERPL-MCNC: 5.4 MG/DL — HIGH (ref 1.6–2.6)
MAGNESIUM SERPL-MCNC: 5.7 MG/DL — HIGH (ref 1.6–2.6)
MCHC RBC-ENTMCNC: 28.4 PG — SIGNIFICANT CHANGE UP (ref 27–34)
MCHC RBC-ENTMCNC: 33.2 GM/DL — SIGNIFICANT CHANGE UP (ref 32–36)
MCV RBC AUTO: 85.5 FL — SIGNIFICANT CHANGE UP (ref 80–100)
MONOCYTES # BLD AUTO: 0.93 K/UL — HIGH (ref 0–0.9)
MONOCYTES NFR BLD AUTO: 5.2 % — SIGNIFICANT CHANGE UP (ref 2–14)
NEUTROPHILS # BLD AUTO: 16.36 K/UL — HIGH (ref 1.8–7.4)
NEUTROPHILS NFR BLD AUTO: 90.6 % — HIGH (ref 43–77)
NRBC # BLD: 0 /100 WBCS — SIGNIFICANT CHANGE UP (ref 0–0)
PLATELET # BLD AUTO: 183 K/UL — SIGNIFICANT CHANGE UP (ref 150–400)
POTASSIUM SERPL-MCNC: 3.6 MMOL/L — SIGNIFICANT CHANGE UP (ref 3.5–5.3)
POTASSIUM SERPL-SCNC: 3.6 MMOL/L — SIGNIFICANT CHANGE UP (ref 3.5–5.3)
PROT SERPL-MCNC: 5.9 G/DL — LOW (ref 6–8.3)
PROTHROM AB SERPL-ACNC: 11.5 SEC — SIGNIFICANT CHANGE UP (ref 10.5–13.4)
RBC # BLD: 4.4 M/UL — SIGNIFICANT CHANGE UP (ref 3.8–5.2)
RBC # FLD: 15.5 % — HIGH (ref 10.3–14.5)
SODIUM SERPL-SCNC: 138 MMOL/L — SIGNIFICANT CHANGE UP (ref 135–145)
URATE SERPL-MCNC: 5.8 MG/DL — SIGNIFICANT CHANGE UP (ref 2.5–7)
WBC # BLD: 18.03 K/UL — HIGH (ref 3.8–10.5)
WBC # FLD AUTO: 18.03 K/UL — HIGH (ref 3.8–10.5)

## 2022-10-29 RX ORDER — MAGNESIUM SULFATE 500 MG/ML
2 VIAL (ML) INJECTION
Qty: 40 | Refills: 0 | Status: DISCONTINUED | OUTPATIENT
Start: 2022-10-29 | End: 2022-10-30

## 2022-10-29 RX ORDER — MAGNESIUM SULFATE 500 MG/ML
2 VIAL (ML) INJECTION
Qty: 40 | Refills: 0 | Status: DISCONTINUED | OUTPATIENT
Start: 2022-10-29 | End: 2022-10-29

## 2022-10-29 RX ORDER — MAGNESIUM SULFATE 500 MG/ML
4 VIAL (ML) INJECTION ONCE
Refills: 0 | Status: COMPLETED | OUTPATIENT
Start: 2022-10-29 | End: 2022-10-29

## 2022-10-29 RX ORDER — IBUPROFEN 200 MG
1 TABLET ORAL
Qty: 0 | Refills: 0 | DISCHARGE
Start: 2022-10-29

## 2022-10-29 RX ORDER — IBUPROFEN 200 MG
600 TABLET ORAL EVERY 6 HOURS
Refills: 0 | Status: DISCONTINUED | OUTPATIENT
Start: 2022-10-29 | End: 2022-10-30

## 2022-10-29 RX ORDER — NIFEDIPINE 30 MG
30 TABLET, EXTENDED RELEASE 24 HR ORAL DAILY
Refills: 0 | Status: DISCONTINUED | OUTPATIENT
Start: 2022-10-29 | End: 2022-10-30

## 2022-10-29 RX ORDER — ACETAMINOPHEN 500 MG
1000 TABLET ORAL ONCE
Refills: 0 | Status: DISCONTINUED | OUTPATIENT
Start: 2022-10-29 | End: 2022-10-30

## 2022-10-29 RX ORDER — ONDANSETRON 8 MG/1
4 TABLET, FILM COATED ORAL ONCE
Refills: 0 | Status: COMPLETED | OUTPATIENT
Start: 2022-10-29 | End: 2022-10-29

## 2022-10-29 RX ORDER — ACETAMINOPHEN 500 MG
2 TABLET ORAL
Qty: 0 | Refills: 0 | DISCHARGE
Start: 2022-10-29

## 2022-10-29 RX ORDER — LABETALOL HCL 100 MG
20 TABLET ORAL ONCE
Refills: 0 | Status: COMPLETED | OUTPATIENT
Start: 2022-10-29 | End: 2022-10-29

## 2022-10-29 RX ADMIN — SODIUM CHLORIDE 3 MILLILITER(S): 9 INJECTION INTRAMUSCULAR; INTRAVENOUS; SUBCUTANEOUS at 20:20

## 2022-10-29 RX ADMIN — ONDANSETRON 4 MILLIGRAM(S): 8 TABLET, FILM COATED ORAL at 02:30

## 2022-10-29 RX ADMIN — SODIUM CHLORIDE 3 MILLILITER(S): 9 INJECTION INTRAMUSCULAR; INTRAVENOUS; SUBCUTANEOUS at 05:53

## 2022-10-29 RX ADMIN — Medication 100 MILLIGRAM(S): at 00:39

## 2022-10-29 RX ADMIN — Medication 600 MILLIGRAM(S): at 19:30

## 2022-10-29 RX ADMIN — Medication 50 GM/HR: at 18:33

## 2022-10-29 RX ADMIN — Medication 600 MILLIGRAM(S): at 12:41

## 2022-10-29 RX ADMIN — Medication 300 GRAM(S): at 02:12

## 2022-10-29 RX ADMIN — Medication 600 MILLIGRAM(S): at 13:10

## 2022-10-29 RX ADMIN — Medication 975 MILLIGRAM(S): at 16:38

## 2022-10-29 RX ADMIN — SODIUM CHLORIDE 3 MILLILITER(S): 9 INJECTION INTRAMUSCULAR; INTRAVENOUS; SUBCUTANEOUS at 14:30

## 2022-10-29 RX ADMIN — Medication 600 MILLIGRAM(S): at 18:32

## 2022-10-29 RX ADMIN — Medication 30 MILLIGRAM(S): at 05:22

## 2022-10-29 RX ADMIN — Medication 30 MILLIGRAM(S): at 05:23

## 2022-10-29 RX ADMIN — Medication 975 MILLIGRAM(S): at 21:03

## 2022-10-29 RX ADMIN — Medication 1 TABLET(S): at 13:16

## 2022-10-29 RX ADMIN — Medication 975 MILLIGRAM(S): at 09:54

## 2022-10-29 RX ADMIN — Medication 600 MILLIGRAM(S): at 19:14

## 2022-10-29 RX ADMIN — Medication 975 MILLIGRAM(S): at 17:11

## 2022-10-29 RX ADMIN — Medication 975 MILLIGRAM(S): at 21:30

## 2022-10-29 RX ADMIN — Medication 50 GM/HR: at 02:38

## 2022-10-29 RX ADMIN — Medication 20 MILLIGRAM(S): at 02:04

## 2022-10-29 RX ADMIN — Medication 30 MILLIGRAM(S): at 05:52

## 2022-10-29 RX ADMIN — Medication 975 MILLIGRAM(S): at 09:02

## 2022-10-29 NOTE — DISCHARGE NOTE OB - NS MD DC FALL RISK RISK
For information on Fall & Injury Prevention, visit: [email or web address] OR  [email or web address] OR  https://www.cdc.gov/steadi/patient.html

## 2022-10-29 NOTE — OB PROVIDER DELIVERY SUMMARY - NSEBL_OBGYN_ALL_OB_NU
500 Mercedes Flap Text: The defect edges were debeveled with a #15 scalpel blade.  Given the location of the defect, shape of the defect and the proximity to free margins a Mercedes flap was deemed most appropriate.  Using a sterile surgical marker, an appropriate advancement flap was drawn incorporating the defect and placing the expected incisions within the relaxed skin tension lines where possible. The area thus outlined was incised deep to adipose tissue with a #15 scalpel blade.  The skin margins were undermined to an appropriate distance in all directions utilizing iris scissors.

## 2022-10-29 NOTE — CHART NOTE - NSCHARTNOTEFT_GEN_A_CORE
R4 Note    Pt seen in recovering room immediately postpartum due to severe range BPs.  Pt w/ no signs/symptoms of preeclampsia at this time- denies headache, blurry vision, epigastric pain, or shortness of breath.  Pain well controlled.  Tolerating PO intake.    Vital Signs Last 24 Hrs  T(C): 36.7 (29 Oct 2022 00:00), Max: 37.3 (28 Oct 2022 14:52)  T(F): 98.1 (29 Oct 2022 00:00), Max: 99.14 (28 Oct 2022 14:52)  HR: 70 (29 Oct 2022 01:45) (55 - 123)  BP: 150/81 (29 Oct 2022 01:45) (108/66 - 162/74)  BP(mean): 110 (29 Oct 2022 01:45) (96 - 115)  RR: 16 (29 Oct 2022 01:45) (15 - 18)  SpO2: 96% (29 Oct 2022 01:45) (70% - 100%)    Parameters below as of 29 Oct 2022 01:45  Patient On (Oxygen Delivery Method): room air    A/P: 33 yo  PPD#1 s/p  c/w retained placenta w/ manual extraction, now w/ severe range BPs postpartum meeting criteria for sPEC    -labetalol 20 mg IV push given to acutely lower BPs  -start magnesium sulfate for seizure ppx  -draw HELLP labs  -monitor BPs    d/w Dr. Kena Patrick PGY4

## 2022-10-29 NOTE — PROGRESS NOTE ADULT - ASSESSMENT
Pt is a 31yo  PPD#1 from  c/b retained placenta requiring manual extraction & ancef 2g IVP postpartum (cytotec ID given). Pt's pp course c/b isolated fever & sPEC, started on magnesium & procardia.      #sPEC  -continue magnesium until 1120p  -continue procardia 30XL  -HELLP labs wnl  -monitor BPs    #postpartum temp  -s/p ancef 2g IVP for manual extraction of placenta  -tylenol given  -if fever persists, pt to be started on unasyn x24hrs    #postpartum  - Continue with po analgesia  - Increase ambulation  - Continue regular diet    Gui Simmons,PGY3

## 2022-10-29 NOTE — DISCHARGE NOTE OB - HOSPITAL COURSE
Pt admitted for IOL   -   Normal post partum course  VSS and afebrile  perineum healing well, min lochia  d/c home ppd#2  instructions given  f/u 6 weeks   Pt admitted for IOL   -   Normal post partum course  VSS and afebrile  perineum healing well, min lochia  d/c home ppd#2  instructions given  RV in 1 week for BP check   f/u 6 weeks for PP visit

## 2022-10-29 NOTE — OB RN DELIVERY SUMMARY - NS_SEPSISRSKCALC_OBGYN_ALL_OB_FT
EOS calculated successfully. EOS Risk Factor: 0.5/1000 live births ([facility] national incidence); GA=40w6d; Temp=99.14; ROM=21.633; GBS='Negative'; Antibiotics='No antibiotics or any antibiotics < 2 hrs prior to birth'

## 2022-10-29 NOTE — DISCHARGE NOTE OB - LAUNCH MEDICATION RECONCILIATION
<<-----Click here for Discharge Medication Review Melolabial Transposition Flap Text: The defect edges were debeveled with a #15c scalpel blade.  Given the location of the defect and the proximity to free margins a melolabial flap was deemed most appropriate.  Using a sterile surgical marker, an appropriate melolabial transposition flap was drawn incorporating the defect.    The area thus outlined was incised deep to adipose tissue with a #15 scalpel blade.  The skin margins were undermined to an appropriate distance in all directions utilizing iris scissors.

## 2022-10-29 NOTE — PROGRESS NOTE ADULT - SUBJECTIVE AND OBJECTIVE BOX
Patient seen and examined at bedside. Pt w/ labile BPs & nonsustained severe range BPs within an hour, meeting criteria for sPEC, s/p Lab 20IVP. Pt started on magesium & procardia 30. Pt also with temp of 39.4 postpartum, no other s/s of endometritis. Tylenol given, temp to be rechecked in AM. No acute complaints, pain well controlled. Patient is ambulating, voiding spontaneously, passing gas, and tolerating regular diet. Denies CP, SOB, N/V, HA, blurred vision, epigastric pain.    Vital Signs Last 24 Hours  T(C): 38.9 (10-29-22 @ 02:20), Max: 38.9 (10-29-22 @ 02:20)  HR: 88 (10-29-22 @ 03:15) (55 - 123)  BP: 123/60 (10-29-22 @ 03:15) (108/66 - 162/74)  RR: 16 (10-29-22 @ 02:00) (15 - 18)  SpO2: 96% (10-29-22 @ 03:15) (70% - 100%)    Physical Exam:  General: NAD  Abdomen: Soft, non-tender, non-distended, fundus firm  Pelvic: Lochia wnl    Labs:    Blood Type: A Negative  Antibody Screen: Positive  RPR: Negative               12.5   18.03 )-----------( 183      ( 10-29 @ 02:28 )             37.6                12.2   9.47  )-----------( 208      ( 10-27 @ 19:44 )             36.9         MEDICATIONS  (STANDING):  acetaminophen     Tablet .. 975 milliGRAM(s) Oral <User Schedule>  acetaminophen   IVPB .. 1000 milliGRAM(s) IV Intermittent once  diphtheria/tetanus/pertussis (acellular) Vaccine (ADAcel) 0.5 milliLiter(s) IntraMuscular once  ibuprofen  Tablet. 600 milliGRAM(s) Oral every 6 hours  ketorolac   Injectable 30 milliGRAM(s) IV Push once  magnesium sulfate Infusion 2 Gm/Hr (50 mL/Hr) IV Continuous <Continuous>  NIFEdipine XL 30 milliGRAM(s) Oral daily  oxytocin Infusion 333.333 milliUNIT(s)/Min (1000 mL/Hr) IV Continuous <Continuous>  oxytocin Infusion 333.333 milliUNIT(s)/Min (1000 mL/Hr) IV Continuous <Continuous>  prenatal multivitamin 1 Tablet(s) Oral daily  sodium chloride 0.9% lock flush 3 milliLiter(s) IV Push every 8 hours  sodium chloride 0.9%. 1000 milliLiter(s) (125 mL/Hr) IV Continuous <Continuous>    MEDICATIONS  (PRN):  benzocaine 20%/menthol 0.5% Spray 1 Spray(s) Topical every 6 hours PRN for Perineal discomfort  dibucaine 1% Ointment 1 Application(s) Topical every 6 hours PRN Perineal discomfort  diphenhydrAMINE 25 milliGRAM(s) Oral every 6 hours PRN Pruritus  hydrocortisone 1% Cream 1 Application(s) Topical every 6 hours PRN Moderate Pain (4-6)  lanolin Ointment 1 Application(s) Topical every 6 hours PRN nipple soreness  magnesium hydroxide Suspension 30 milliLiter(s) Oral two times a day PRN Constipation  oxyCODONE    IR 5 milliGRAM(s) Oral every 3 hours PRN Moderate to Severe Pain (4-10)  oxyCODONE    IR 5 milliGRAM(s) Oral once PRN Moderate to Severe Pain (4-10)  pramoxine 1%/zinc 5% Cream 1 Application(s) Topical every 4 hours PRN Moderate Pain (4-6)  simethicone 80 milliGRAM(s) Chew every 4 hours PRN Gas  witch hazel Pads 1 Application(s) Topical every 4 hours PRN Perineal discomfort

## 2022-10-29 NOTE — CHART NOTE - NSCHARTNOTEFT_GEN_A_CORE
Called to bedside for moderate bleeding on fundal check. On evaluation, fundus firm and no clots were expressed, small trickle noted. Pt is without complaints. 1000mg of rectal cytotec placed.     Vital Signs Last 24 Hrs  T(C): 36.7 (29 Oct 2022 00:00), Max: 37.3 (28 Oct 2022 14:52)  T(F): 98.1 (29 Oct 2022 00:00), Max: 99.14 (28 Oct 2022 14:52)  HR: 80 (29 Oct 2022 01:00) (55 - 123)  BP: 157/77 (29 Oct 2022 01:00) (108/66 - 157/77)  BP(mean): 110 (29 Oct 2022 01:00) (96 - 110)  RR: 17 (29 Oct 2022 00:30) (15 - 18)  SpO2: 99% (29 Oct 2022 01:00) (70% - 100%)    LCavalieri PAC

## 2022-10-29 NOTE — OB PROVIDER DELIVERY SUMMARY - NSPROVIDERDELIVERYNOTE_OBGYN_ALL_OB_FT
Bed Broken  Right anterior shoulder --> left posterior shoulder  spontaneous cry and good tone on delivery (peds present)  viable baby boy  2nd degree laceration - Betadine used  repaired in layers 2.0 and 3.0 vicryl rapide   at 30min - placenta removed with manual extraction  sonogram done - thin EMS - placenta inspected, appeared to be intact - sent to pathology  uterus well contracted  cervix and rectum intact  counts correct

## 2022-10-29 NOTE — DISCHARGE NOTE OB - PATIENT PORTAL LINK FT
You can access the FollowMyHealth Patient Portal offered by [facility] by registering at the following website: [email or web address]. By joining [facility]’s FollowMyHealth portal, you will also be able to view your health information using other applications (apps) compatible with our system.

## 2022-10-29 NOTE — DISCHARGE NOTE OB - CARE PROVIDER_API CALL
Lana Escudero)  Obstetrics and Gynecology  [address], [address]  [place] [id number]  Phone: [phone]  Fax: [phone]  Follow Up Time:

## 2022-10-29 NOTE — OB RN DELIVERY SUMMARY - AMNIOTIC FLUID ODOR, LABOR
normal Detail Level: Simple Detail Level: Generalized Quality 224: Stage 0-Iic Melanoma: Overutilization Of Imaging Studies For Only Stage 0-Iic Melanoma: None of the following diagnostic imaging studies ordered: chest X-ray, CT, Ultrasound, MRI, PET, or nuclear medicine scans (ML) Quality 137: Melanoma: Continuity Of Care - Recall System: Patient information entered into a recall system that includes: target date for the next exam specified AND a process to follow up with patients regarding missed or unscheduled appointments

## 2022-10-29 NOTE — OB RN DELIVERY SUMMARY - NSSELHIDDEN_OBGYN_ALL_OB_FT
[[email or web address]_OB_FT:[id number]],[NS_DeliveryRN_OBGYN_ALL_OB_FT:[id number]]

## 2022-10-30 VITALS
SYSTOLIC BLOOD PRESSURE: 110 MMHG | DIASTOLIC BLOOD PRESSURE: 72 MMHG | RESPIRATION RATE: 18 BRPM | OXYGEN SATURATION: 97 % | HEART RATE: 82 BPM | TEMPERATURE: 98 F

## 2022-10-30 LAB
ALBUMIN SERPL ELPH-MCNC: 2.9 G/DL — LOW (ref 3.3–5)
ALP SERPL-CCNC: 123 U/L — HIGH (ref 40–120)
ALT FLD-CCNC: 40 U/L — SIGNIFICANT CHANGE UP (ref 10–45)
ANION GAP SERPL CALC-SCNC: 9 MMOL/L — SIGNIFICANT CHANGE UP (ref 5–17)
APTT BLD: 25.8 SEC — LOW (ref 27.5–35.5)
AST SERPL-CCNC: 44 U/L — HIGH (ref 10–40)
BASOPHILS # BLD AUTO: 0.06 K/UL — SIGNIFICANT CHANGE UP (ref 0–0.2)
BASOPHILS NFR BLD AUTO: 0.5 % — SIGNIFICANT CHANGE UP (ref 0–2)
BILIRUB SERPL-MCNC: 0.3 MG/DL — SIGNIFICANT CHANGE UP (ref 0.2–1.2)
BUN SERPL-MCNC: 9 MG/DL — SIGNIFICANT CHANGE UP (ref 7–23)
CALCIUM SERPL-MCNC: 7.9 MG/DL — LOW (ref 8.4–10.5)
CHLORIDE SERPL-SCNC: 103 MMOL/L — SIGNIFICANT CHANGE UP (ref 96–108)
CO2 SERPL-SCNC: 25 MMOL/L — SIGNIFICANT CHANGE UP (ref 22–31)
CREAT SERPL-MCNC: 0.72 MG/DL — SIGNIFICANT CHANGE UP (ref 0.5–1.3)
EGFR: 115 ML/MIN/1.73M2 — SIGNIFICANT CHANGE UP
EOSINOPHIL # BLD AUTO: 0.19 K/UL — SIGNIFICANT CHANGE UP (ref 0–0.5)
EOSINOPHIL NFR BLD AUTO: 1.5 % — SIGNIFICANT CHANGE UP (ref 0–6)
FIBRINOGEN PPP-MCNC: 960 MG/DL — HIGH (ref 330–520)
GLUCOSE SERPL-MCNC: 94 MG/DL — SIGNIFICANT CHANGE UP (ref 70–99)
HCT VFR BLD CALC: 31.8 % — LOW (ref 34.5–45)
HGB BLD-MCNC: 10.4 G/DL — LOW (ref 11.5–15.5)
IMM GRANULOCYTES NFR BLD AUTO: 0.6 % — SIGNIFICANT CHANGE UP (ref 0–0.9)
INR BLD: 0.92 RATIO — SIGNIFICANT CHANGE UP (ref 0.88–1.16)
LDH SERPL L TO P-CCNC: 315 U/L — HIGH (ref 50–242)
LYMPHOCYTES # BLD AUTO: 1.49 K/UL — SIGNIFICANT CHANGE UP (ref 1–3.3)
LYMPHOCYTES # BLD AUTO: 11.9 % — LOW (ref 13–44)
MCHC RBC-ENTMCNC: 28.3 PG — SIGNIFICANT CHANGE UP (ref 27–34)
MCHC RBC-ENTMCNC: 32.7 GM/DL — SIGNIFICANT CHANGE UP (ref 32–36)
MCV RBC AUTO: 86.4 FL — SIGNIFICANT CHANGE UP (ref 80–100)
MONOCYTES # BLD AUTO: 0.65 K/UL — SIGNIFICANT CHANGE UP (ref 0–0.9)
MONOCYTES NFR BLD AUTO: 5.2 % — SIGNIFICANT CHANGE UP (ref 2–14)
NEUTROPHILS # BLD AUTO: 10.06 K/UL — HIGH (ref 1.8–7.4)
NEUTROPHILS NFR BLD AUTO: 80.3 % — HIGH (ref 43–77)
NRBC # BLD: 0 /100 WBCS — SIGNIFICANT CHANGE UP (ref 0–0)
PLATELET # BLD AUTO: 206 K/UL — SIGNIFICANT CHANGE UP (ref 150–400)
POTASSIUM SERPL-MCNC: 3.5 MMOL/L — SIGNIFICANT CHANGE UP (ref 3.5–5.3)
POTASSIUM SERPL-SCNC: 3.5 MMOL/L — SIGNIFICANT CHANGE UP (ref 3.5–5.3)
PROT SERPL-MCNC: 6.1 G/DL — SIGNIFICANT CHANGE UP (ref 6–8.3)
PROTHROM AB SERPL-ACNC: 10.7 SEC — SIGNIFICANT CHANGE UP (ref 10.5–13.4)
RBC # BLD: 3.68 M/UL — LOW (ref 3.8–5.2)
RBC # FLD: 15.8 % — HIGH (ref 10.3–14.5)
SODIUM SERPL-SCNC: 137 MMOL/L — SIGNIFICANT CHANGE UP (ref 135–145)
URATE SERPL-MCNC: 4.7 MG/DL — SIGNIFICANT CHANGE UP (ref 2.5–7)
WBC # BLD: 12.53 K/UL — HIGH (ref 3.8–10.5)
WBC # FLD AUTO: 12.53 K/UL — HIGH (ref 3.8–10.5)

## 2022-10-30 PROCEDURE — 86769 SARS-COV-2 COVID-19 ANTIBODY: CPT

## 2022-10-30 PROCEDURE — 86900 BLOOD TYPING SEROLOGIC ABO: CPT

## 2022-10-30 PROCEDURE — 59050 FETAL MONITOR W/REPORT: CPT

## 2022-10-30 PROCEDURE — 85610 PROTHROMBIN TIME: CPT

## 2022-10-30 PROCEDURE — 86780 TREPONEMA PALLIDUM: CPT

## 2022-10-30 PROCEDURE — 85460 HEMOGLOBIN FETAL: CPT

## 2022-10-30 PROCEDURE — 83615 LACTATE (LD) (LDH) ENZYME: CPT

## 2022-10-30 PROCEDURE — 80053 COMPREHEN METABOLIC PANEL: CPT

## 2022-10-30 PROCEDURE — 85730 THROMBOPLASTIN TIME PARTIAL: CPT

## 2022-10-30 PROCEDURE — 85384 FIBRINOGEN ACTIVITY: CPT

## 2022-10-30 PROCEDURE — 84550 ASSAY OF BLOOD/URIC ACID: CPT

## 2022-10-30 PROCEDURE — 83735 ASSAY OF MAGNESIUM: CPT

## 2022-10-30 PROCEDURE — 36415 COLL VENOUS BLD VENIPUNCTURE: CPT

## 2022-10-30 PROCEDURE — 85025 COMPLETE CBC W/AUTO DIFF WBC: CPT

## 2022-10-30 PROCEDURE — 59025 FETAL NON-STRESS TEST: CPT

## 2022-10-30 PROCEDURE — 86850 RBC ANTIBODY SCREEN: CPT

## 2022-10-30 PROCEDURE — 86901 BLOOD TYPING SEROLOGIC RH(D): CPT

## 2022-10-30 PROCEDURE — 88307 TISSUE EXAM BY PATHOLOGIST: CPT

## 2022-10-30 PROCEDURE — 86870 RBC ANTIBODY IDENTIFICATION: CPT

## 2022-10-30 RX ORDER — NIFEDIPINE 30 MG
1 TABLET, EXTENDED RELEASE 24 HR ORAL
Qty: 30 | Refills: 0
Start: 2022-10-30

## 2022-10-30 RX ORDER — NIFEDIPINE 30 MG
1 TABLET, EXTENDED RELEASE 24 HR ORAL
Qty: 30 | Refills: 0
Start: 2022-10-30 | End: 2022-11-28

## 2022-10-30 RX ADMIN — Medication 975 MILLIGRAM(S): at 09:15

## 2022-10-30 RX ADMIN — SODIUM CHLORIDE 3 MILLILITER(S): 9 INJECTION INTRAMUSCULAR; INTRAVENOUS; SUBCUTANEOUS at 06:36

## 2022-10-30 RX ADMIN — Medication 600 MILLIGRAM(S): at 12:11

## 2022-10-30 RX ADMIN — Medication 600 MILLIGRAM(S): at 12:57

## 2022-10-30 RX ADMIN — Medication 975 MILLIGRAM(S): at 14:55

## 2022-10-30 RX ADMIN — Medication 600 MILLIGRAM(S): at 01:00

## 2022-10-30 RX ADMIN — Medication 975 MILLIGRAM(S): at 04:00

## 2022-10-30 RX ADMIN — Medication 600 MILLIGRAM(S): at 06:28

## 2022-10-30 RX ADMIN — Medication 30 MILLIGRAM(S): at 06:28

## 2022-10-30 RX ADMIN — Medication 975 MILLIGRAM(S): at 03:17

## 2022-10-30 RX ADMIN — Medication 600 MILLIGRAM(S): at 00:18

## 2022-10-30 RX ADMIN — Medication 975 MILLIGRAM(S): at 10:57

## 2022-10-30 RX ADMIN — Medication 1 TABLET(S): at 12:57

## 2022-10-30 NOTE — PROGRESS NOTE ADULT - ASSESSMENT
33yo  PPD#2 from  c/b retained placenta requiring manual extraction & ancef 2g IVP postpartum (cytotec NE given). Pt's pp course c/b isolated fever & sPEC, started on magnesium & procardia.  Patient currently doing well:    #sPEC  -s/p magnesium (10/28-)  -continue procardia 30XL  -HELLP labs wnl  -monitor BPs    #postpartum temp  -s/p ancef 2g IVP for manual extraction of placenta  - tylenol given  -if fever persists, pt to be started on unasyn x24hrs    #postpartum  - Continue with po analgesia  - Increase ambulation  - Continue regular diet    Amyeo Afroz Jereen, PGY-2       31yo  PPD#2 from  c/b retained placenta requiring manual extraction & ancef 2g IVP postpartum (cytotec MA given). Pt's pp course c/b isolated fever & sPEC, s/p magnesium & currently on procardia.  Patient currently doing well.    #sPEC  -s/p magnesium (10/28-)  -continue procardia 30XL  -HELLP labs wnl  -monitor BPs    #postpartum temp  -s/p ancef 2g IVP for manual extraction of placenta  - tylenol given  -if fever persists, pt to be started on unasyn x24hrs    #postpartum  - Continue with po analgesia  - Increase ambulation  - Continue regular diet    Amyeo Afroz Jereen, PGY-2

## 2022-10-30 NOTE — PROGRESS NOTE ADULT - SUBJECTIVE AND OBJECTIVE BOX
OB Progress Note:  PPD #2    S: 33yo  now PPD#2 s/p . Patient feels well. Pain is well controlled. She is tolerating a regular diet and passing flatus. She is voiding spontaneously, and ambulating without difficulty. Denies CP/SOB. Denies lightheadedness/dizziness. Denies N/V.    O:  Vitals:  Vital Signs Last 24 Hrs  T(C): 36.4 (30 Oct 2022 00:25), Max: 36.9 (29 Oct 2022 05:05)  T(F): 97.5 (30 Oct 2022 00:25), Max: 98.5 (29 Oct 2022 05:05)  HR: 95 (30 Oct 2022 00:25) (67 - 98)  BP: 101/70 (30 Oct 2022 00:25) (101/70 - 137/87)  BP(mean): 97 (29 Oct 2022 08:57) (97 - 99)  RR: 18 (30 Oct 2022 00:25) (18 - 18)  SpO2: 98% (30 Oct 2022 00:25) (95% - 99%)    Parameters below as of 30 Oct 2022 00:25  Patient On (Oxygen Delivery Method): room air        MEDICATIONS  (STANDING):  acetaminophen     Tablet .. 975 milliGRAM(s) Oral <User Schedule>  acetaminophen   IVPB .. 1000 milliGRAM(s) IV Intermittent once  diphtheria/tetanus/pertussis (acellular) Vaccine (ADAcel) 0.5 milliLiter(s) IntraMuscular once  ibuprofen  Tablet. 600 milliGRAM(s) Oral every 6 hours  magnesium sulfate Infusion 2 Gm/Hr (50 mL/Hr) IV Continuous <Continuous>  NIFEdipine XL 30 milliGRAM(s) Oral daily  oxytocin Infusion 333.333 milliUNIT(s)/Min (1000 mL/Hr) IV Continuous <Continuous>  oxytocin Infusion 333.333 milliUNIT(s)/Min (1000 mL/Hr) IV Continuous <Continuous>  prenatal multivitamin 1 Tablet(s) Oral daily  sodium chloride 0.9% lock flush 3 milliLiter(s) IV Push every 8 hours  sodium chloride 0.9%. 1000 milliLiter(s) (125 mL/Hr) IV Continuous <Continuous>      Labs:  Blood type: A Negative  Rubella IgG: RPR: Negative                          12.5   18.03<H> >-----------< 183    ( 10-29 @ 02:28 )             37.6                        12.2   9.47 >-----------< 208    ( 10-27 @ 19:44 )             36.9    10-29-22 @ 02:28      138  |  103  |  7   ----------------------------<  100<H>  3.6   |  21<L>  |  0.82        Ca    8.8      29 Oct 2022 02:28  Mg     5.4<H>     10-29  Mg     5.7<H>     10-29  Mg     4.7<H>     10-29    TPro  5.9<L>  /  Alb  3.2<L>  /  TBili  0.6  /  DBili  x   /  AST  63<H>  /  ALT  43  /  AlkPhos  141<H>  10-29-22 @ 02:28          Physical Exam:  General: NAD  Abdomen: Soft, non-tender, non-distended, fundus firm  Vaginal: Lochia wnl  Extremities: No erythema/edema    A/P: 33yo PPD #2 s/p .  Patient is stable and doing well post-partum.   - Pain well controlled, continue current pain regimen. Standing Ibuprofen, Acetaminophen. Oxycodone available PRN for breakthrough pain.  - Increase ambulation, SCDs when not ambulating  - Continue regular diet    Amyeo Afroz Jereen, PGY-1 OB Progress Note:  PPD #2    S: 33yo  now PPD#2 s/p . Patient feels well. Pain is well controlled. She is tolerating a regular diet and passing flatus. She is voiding spontaneously, and ambulating without difficulty. Denies CP/SOB. Denies lightheadedness/dizziness. Denies N/V.    O:  Vitals:  Vital Signs Last 24 Hrs  T(C): 36.4 (30 Oct 2022 00:25), Max: 36.9 (29 Oct 2022 05:05)  T(F): 97.5 (30 Oct 2022 00:25), Max: 98.5 (29 Oct 2022 05:05)  HR: 95 (30 Oct 2022 00:25) (67 - 98)  BP: 101/70 (30 Oct 2022 00:25) (101/70 - 137/87)  BP(mean): 97 (29 Oct 2022 08:57) (97 - 99)  RR: 18 (30 Oct 2022 00:25) (18 - 18)  SpO2: 98% (30 Oct 2022 00:25) (95% - 99%)    Parameters below as of 30 Oct 2022 00:25  Patient On (Oxygen Delivery Method): room air        MEDICATIONS  (STANDING):  acetaminophen     Tablet .. 975 milliGRAM(s) Oral <User Schedule>  acetaminophen   IVPB .. 1000 milliGRAM(s) IV Intermittent once  diphtheria/tetanus/pertussis (acellular) Vaccine (ADAcel) 0.5 milliLiter(s) IntraMuscular once  ibuprofen  Tablet. 600 milliGRAM(s) Oral every 6 hours  magnesium sulfate Infusion 2 Gm/Hr (50 mL/Hr) IV Continuous <Continuous>  NIFEdipine XL 30 milliGRAM(s) Oral daily  oxytocin Infusion 333.333 milliUNIT(s)/Min (1000 mL/Hr) IV Continuous <Continuous>  oxytocin Infusion 333.333 milliUNIT(s)/Min (1000 mL/Hr) IV Continuous <Continuous>  prenatal multivitamin 1 Tablet(s) Oral daily  sodium chloride 0.9% lock flush 3 milliLiter(s) IV Push every 8 hours  sodium chloride 0.9%. 1000 milliLiter(s) (125 mL/Hr) IV Continuous <Continuous>      Labs:  Blood type: A Negative  Rubella IgG: RPR: Negative                          12.5   18.03<H> >-----------< 183    ( 10-29 @ 02:28 )             37.6                        12.2   9.47 >-----------< 208    ( 10-27 @ 19:44 )             36.9    10-29-22 @ 02:28      138  |  103  |  7   ----------------------------<  100<H>  3.6   |  21<L>  |  0.82        Ca    8.8      29 Oct 2022 02:28  Mg     5.4<H>     10-29  Mg     5.7<H>     10-29  Mg     4.7<H>     10-29    TPro  5.9<L>  /  Alb  3.2<L>  /  TBili  0.6  /  DBili  x   /  AST  63<H>  /  ALT  43  /  AlkPhos  141<H>  10-29-22 @ 02:28          Physical Exam:  General: NAD  Abdomen: Soft, non-tender, non-distended, fundus firm  Vaginal: Lochia wnl  Extremities: No erythema/edema

## 2022-10-30 NOTE — PROGRESS NOTE ADULT - ATTENDING COMMENTS
Patient is PPD #1 s/p  complicated by febrile episode PP and severe pre-eclampsia currently on MgSO4 therapy. Reports feeling well, denies chest pain, shortness of breath, nausea/vomiting, epigastric pain or visual disturbances. Pain well controlled.   Vitals reviewed T 97.3 114/78 (110-130s/70-80s) P 78 RR 18   Gen: no acute distress   Abd: soft, nontender, fundus firm beneath umbilicus  Perineum: minimal lochia rubra   Ext: no calf tenderness, 3+ DTR LE   Meds and labs reviewed   A/P: PPD #1 s/p  complicated by severe pre-eclampsia, currently afebrile   -continue MgSO4 x 24h PP   -continue Procardia XL   -monitor vitals   -pain control PRN   -male infant for circ, consents signed   -dispo planning     ZOILA Valderrama
PPD #2 s/p  complicated by severe pre-eclampsia s/p MgSO4 therapy. Reports feeling well. Denies chest pain, shortness of breath, nausea/vomiting, headaches, RUQ/epigastric pain or visual disturbances. Pain well controlled, tolerating diet, ambulating and voiding spontaneously.  Declined circ this morning.    Vitals T 97.6 107/73 (101-118/70s) P 79 RR 18   Gen: no acute distress   Abd: soft, nontender, fundus beneath umbilicus   Perineum: minimal lochia rubra   Ext: no calf tenderness   Labs and meds reviewed  A/P: PPD #2 s/p , severe pre-eclampsia, normotensive and afebrile   -continue Procardia XL   -continue to monitor vitals   -pain control PRN   -monitor vaginal bleeding   -for d/c home with return precautions     ZOILA Valderrama

## 2022-11-05 LAB — SURGICAL PATHOLOGY STUDY: SIGNIFICANT CHANGE UP

## 2022-11-08 RX ORDER — CEFPROZIL 250 MG/1
250 TABLET ORAL
Qty: 28 | Refills: 0 | Status: DISCONTINUED | COMMUNITY
Start: 2018-10-09 | End: 2022-11-08

## 2022-11-08 RX ORDER — LEVALBUTEROL TARTRATE 45 UG/1
45 AEROSOL, METERED ORAL
Qty: 1 | Refills: 2 | Status: DISCONTINUED | COMMUNITY
Start: 2018-10-15 | End: 2022-11-08

## 2022-11-08 RX ORDER — PREDNISONE 10 MG/1
10 TABLET ORAL
Qty: 12 | Refills: 0 | Status: DISCONTINUED | COMMUNITY
Start: 2018-10-15 | End: 2022-11-08

## 2022-11-08 RX ORDER — NIFEDIPINE 30 MG/1
30 TABLET, FILM COATED, EXTENDED RELEASE ORAL
Qty: 30 | Refills: 0 | Status: ACTIVE | COMMUNITY
Start: 2022-10-30

## 2022-11-08 RX ORDER — NORGESTIMATE AND ETHINYL ESTRADIOL 7DAYSX3 28
KIT ORAL
Refills: 0 | Status: DISCONTINUED | COMMUNITY
End: 2022-11-08

## 2022-11-09 ENCOUNTER — APPOINTMENT (OUTPATIENT)
Dept: CARDIOLOGY | Facility: CLINIC | Age: 32
End: 2022-11-09

## 2022-11-09 ENCOUNTER — NON-APPOINTMENT (OUTPATIENT)
Age: 32
End: 2022-11-09

## 2022-11-09 VITALS
SYSTOLIC BLOOD PRESSURE: 122 MMHG | OXYGEN SATURATION: 98 % | HEIGHT: 65 IN | WEIGHT: 170 LBS | BODY MASS INDEX: 28.32 KG/M2 | HEART RATE: 90 BPM | DIASTOLIC BLOOD PRESSURE: 94 MMHG

## 2022-11-09 VITALS — DIASTOLIC BLOOD PRESSURE: 84 MMHG | SYSTOLIC BLOOD PRESSURE: 108 MMHG

## 2022-11-09 DIAGNOSIS — R00.2 PALPITATIONS: ICD-10-CM

## 2022-11-09 DIAGNOSIS — O14.90 UNSPECIFIED PRE-ECLAMPSIA, UNSPECIFIED TRIMESTER: ICD-10-CM

## 2022-11-09 PROCEDURE — 93000 ELECTROCARDIOGRAM COMPLETE: CPT

## 2022-11-09 PROCEDURE — 99204 OFFICE O/P NEW MOD 45 MIN: CPT | Mod: 25

## 2022-11-09 NOTE — DISCUSSION/SUMMARY
[FreeTextEntry1] : 32 year woman with a history as listed presents for an initial cardiac evaluation. \par Marta now has post partum preeclampsia. She is currently controlled on NIfedipine 30mg Qday. I suspect that her blood pressure will normalize soon. She will try to maintain a BP log at home. Reducing dietary salt intake advised. \par Her EKG did not reveal any significant ischemic changes. She will get a 2d echo to assess for any  new structural heart disease, changes in valvular and ventricular function. \par Exercise and diet counseling was performed in order to reduce her future cardiovascular risk.\par She will followup with me in 4-6 weeks or sooner if necessary. \par  [EKG obtained to assist in diagnosis and management of assessed problem(s)] : EKG obtained to assist in diagnosis and management of assessed problem(s)

## 2022-11-09 NOTE — HISTORY OF PRESENT ILLNESS
[FreeTextEntry1] : 32 year old woman with a history of preeclampsia presents for an initial visit. \par \par She is . \par She is about 10 days post partum. She had normal vaginal delivery at 40 weeks of gestation. She had a retained placenta and it had to be manually removed. She then  was noted to have high BP. She was treated with Magnesium and NIfedipine 30mg Qday.  She had a bout of self limiting palpitation. \par She is currently breast feeling. \par Since her discharge, she has been feeling relatively well. She   denies any chest pain, PND, orthopnea, lower extremity edema, near syncope, syncope, strokelike symptoms. She denies any blurry vision, headaches. Her home blood pressure are about 120-135/80-90s. \par She recently had a UTI and was placed on antibiotics. \par

## 2022-11-17 ENCOUNTER — APPOINTMENT (OUTPATIENT)
Dept: CARDIOLOGY | Facility: CLINIC | Age: 32
End: 2022-11-17

## 2022-12-19 NOTE — OB PROVIDER H&P - ALERT: PERTINENT HISTORY
Detail Level: Generalized Quality 130: Documentation Of Current Medications In The Medical Record: Current Medications Documented 1st Trimester Sonogram/20 Week Level II Sonogram/BioPhysical Profile(s)/Non Invasive Prenatal Screen (NIPS)/Fetal Non-Stress Test (NST)

## 2022-12-20 ENCOUNTER — APPOINTMENT (OUTPATIENT)
Dept: CARDIOLOGY | Facility: CLINIC | Age: 32
End: 2022-12-20

## 2022-12-20 PROCEDURE — 99213 OFFICE O/P EST LOW 20 MIN: CPT | Mod: 95

## 2022-12-20 NOTE — DISCUSSION/SUMMARY
[FreeTextEntry1] : 32 year woman with a history as listed presents for a followup cardiac evaluation. \par Marta is doing well overall. Her EKG did not reveal any significant ischemic changes. Her blood pressure has normalized. She will stay off of Nifedipine. Continue to monitor her BP. \par She will get a 2d echo to assess for any new structural heart disease, changes in valvular and ventricular function. \par Exercise and diet counseling was performed in order to reduce her future cardiovascular risk.\par She will followup with me on as needed basis. \par

## 2022-12-20 NOTE — HISTORY OF PRESENT ILLNESS
[FreeTextEntry1] : 32 year old woman with a history of preeclampsia presents for a followup visit. \par \par She is . \par She stopped her Nifedipine on 22. Her blood pressure has been controlled. Still 110/80s. She is currently breast feeling. \par She   denies any chest pain, PND, orthopnea, lower extremity edema, near syncope, syncope, strokelike symptoms.

## 2022-12-20 NOTE — REASON FOR VISIT
[Home] : at home, [unfilled] , at the time of the visit. [Medical Office: ([facility])___] : at the medical office located in  [Patient] : the patient

## 2023-02-23 ENCOUNTER — APPOINTMENT (OUTPATIENT)
Dept: ORTHOPEDIC SURGERY | Facility: CLINIC | Age: 33
End: 2023-02-23
Payer: COMMERCIAL

## 2023-02-23 ENCOUNTER — NON-APPOINTMENT (OUTPATIENT)
Age: 33
End: 2023-02-23

## 2023-02-23 VITALS
SYSTOLIC BLOOD PRESSURE: 114 MMHG | WEIGHT: 150 LBS | OXYGEN SATURATION: 98 % | RESPIRATION RATE: 17 BRPM | HEIGHT: 65 IN | DIASTOLIC BLOOD PRESSURE: 72 MMHG | BODY MASS INDEX: 24.99 KG/M2 | HEART RATE: 68 BPM

## 2023-02-23 DIAGNOSIS — M54.2 CERVICALGIA: ICD-10-CM

## 2023-02-23 PROCEDURE — 99203 OFFICE O/P NEW LOW 30 MIN: CPT

## 2023-02-23 NOTE — PHYSICAL EXAM
[de-identified] : General: No acute distress\par Mental: Alert and oriented x3\par Eyes: Conjunctivitis not seen\par Chest: Symmetric chest rise, no audible wheezing\par Skin: Bilateral lower extremities absent from rashes and ulcers\par Abdomen: No distention\par \par Spine: Mildly tender at the midline cervical spine.  No step-offs.  Intact skin without ecchymosis, swelling, edema, skin lesions.\par Tenderness at the right trapezius muscle, nontender at the left.\par 5/5 muscle strength bilaterally at the upper extremities, 2/2 sensation bilaterally of the upper extremities.\par Symmetric reflexes

## 2023-02-23 NOTE — DISCUSSION/SUMMARY
[de-identified] : 33-year-old female with cervical paraspinal muscle strain and tendinopathy.  She is given a prescription for physical therapy.  She may continue ice, heat, anti-inflammatories or Tylenol as needed, stretching exercises, massage, and working on modifying posture while taking care of her baby.  All of her questions were answered and she will follow-up as needed.

## 2023-02-23 NOTE — HISTORY OF PRESENT ILLNESS
[de-identified] : Patient presents with chronic neck pain over the past 3 months that seems to started after giving birth.  She reports changing her posture and frequent leaning over that has exacerbated the symptoms.  Pain is typically mild to moderate, located along the right side of her neck without radiation distally.  She denies any pain radiating down the arms, denies numbness or tingling or weakness.  She also occasionally has pain in the midline of the neck and no history of specific injury.  She has tried doing some stretches, applying Biofreeze, taking anti-inflammatories, and massage.  Pain is persistent and she would like to try a course of physical therapy.\par She is otherwise healthy and gave birth approximately 3 months ago.

## 2024-05-28 NOTE — DISCHARGE NOTE OB - BREAST MILK PROVIDES PROTECTION AGAINST INFECTION
Call to Dr Negron's office to get clarification on if patient will need a pre op appt with Dr Herbert. Per patient first surgery for right eye is is 6/17 and left eye 7/1.     Left vm for Dolly Negron's surgery coordinator. Advised she give me a call back.  
Received voicemail from Dolly and she stated that patient does not need a H&P/pre op due to patient being on IV conscious sedation. Pre op would be up to Dr Herbert if she feels patient needs it. Nothing else needed at this time. Confirmed surgery for right eye is 6/18 and left eye 7/1.  
Statement Selected

## 2025-02-05 NOTE — DISCHARGE NOTE OB - AVOID PROLONGED STANDING
CARDIOLOGY FOLLOW UP      Patient Name: Tomer Amaya  Primary Care physician: Cristina Interiano DO    Reason for Referral/Chief Complaint: Tomer Amaya is a 52 y.o. patient who is presenting today for a cardiology follow up.     Chief Complaint   Patient presents with    Follow-up     2 mo fu     Hypertension    Atrial Fibrillation    Bradycardia      History of Present Illness:   Tomer Amaya is a 52 y.o. 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   Patient presented to [facility] 11/4/23 and 11/7/23, for tachycardia and palpitations. Patient does have a known history of tachycardia as well as hypertension. Patient was started on Toprol XL 50 mg daily. He was originally seen in office 11/13/23 and reported chronic fatigue and anxiety. He did not notice associated symptoms when he had the heart palpitations and stated he has been more fatigued since his first ED visit 11/4/23 and since starting beta blocker.    He completed an echocardiogram 12/11/23 that showed normal LVEF, MVP and moderate to severe mitral regurgitation. Patient underwent HENRIETTA 1/12/2024 which demonstrated an LVEF of 55%, prolapse of both mitral leaflets, multiple regurgitant jets present which makes it somewhat difficult to accurately quantify the severity of mitral regurgitation present, There appears to be mild pulmonary vein systolic flow reversal. Overall findings are consistent with moderate-severe mitral regurgitation. A bubble study was performed and shows evidence of a delayed right-to-left shunt, suggestive of an intrapulmonary shunt.   OV 3/14/24, He reported that he experiences palpitations when 
Statement Selected

## 2025-07-11 ENCOUNTER — OUTPATIENT (OUTPATIENT)
Dept: OUTPATIENT SERVICES | Facility: HOSPITAL | Age: 35
LOS: 1 days | End: 2025-07-11

## 2025-07-11 ENCOUNTER — APPOINTMENT (OUTPATIENT)
Dept: ULTRASOUND IMAGING | Facility: CLINIC | Age: 35
End: 2025-07-11

## 2025-07-11 DIAGNOSIS — N64.52 NIPPLE DISCHARGE: ICD-10-CM

## 2025-07-14 ENCOUNTER — OUTPATIENT (OUTPATIENT)
Dept: OUTPATIENT SERVICES | Facility: HOSPITAL | Age: 35
LOS: 1 days | End: 2025-07-14
Payer: COMMERCIAL

## 2025-07-14 ENCOUNTER — APPOINTMENT (OUTPATIENT)
Dept: MAMMOGRAPHY | Facility: CLINIC | Age: 35
End: 2025-07-14
Payer: COMMERCIAL

## 2025-07-14 DIAGNOSIS — Z00.8 ENCOUNTER FOR OTHER GENERAL EXAMINATION: ICD-10-CM

## 2025-07-14 DIAGNOSIS — N64.52 NIPPLE DISCHARGE: ICD-10-CM

## 2025-07-14 PROCEDURE — 77066 DX MAMMO INCL CAD BI: CPT

## 2025-07-14 PROCEDURE — G0279: CPT

## 2025-07-14 PROCEDURE — 77066 DX MAMMO INCL CAD BI: CPT | Mod: 26

## 2025-07-14 PROCEDURE — 76641 ULTRASOUND BREAST COMPLETE: CPT

## 2025-07-14 PROCEDURE — 76641 ULTRASOUND BREAST COMPLETE: CPT | Mod: 26,50

## 2025-07-14 PROCEDURE — G0279: CPT | Mod: 26

## 2025-07-24 ENCOUNTER — APPOINTMENT (OUTPATIENT)
Dept: MRI IMAGING | Facility: IMAGING CENTER | Age: 35
End: 2025-07-24
Payer: COMMERCIAL

## 2025-07-24 ENCOUNTER — OUTPATIENT (OUTPATIENT)
Dept: OUTPATIENT SERVICES | Facility: HOSPITAL | Age: 35
LOS: 1 days | End: 2025-07-24
Payer: COMMERCIAL

## 2025-07-24 DIAGNOSIS — Z00.8 ENCOUNTER FOR OTHER GENERAL EXAMINATION: ICD-10-CM

## 2025-07-24 PROCEDURE — 77049 MRI BREAST C-+ W/CAD BI: CPT | Mod: 26

## 2025-07-24 PROCEDURE — A9585: CPT

## 2025-07-24 PROCEDURE — C8937: CPT

## 2025-07-24 PROCEDURE — C8908: CPT

## 2025-08-14 ENCOUNTER — OUTPATIENT (OUTPATIENT)
Dept: OUTPATIENT SERVICES | Facility: HOSPITAL | Age: 35
LOS: 1 days | End: 2025-08-14
Payer: COMMERCIAL

## 2025-08-14 ENCOUNTER — APPOINTMENT (OUTPATIENT)
Dept: MRI IMAGING | Facility: IMAGING CENTER | Age: 35
End: 2025-08-14
Payer: COMMERCIAL

## 2025-08-14 DIAGNOSIS — R92.8 OTHER ABNORMAL AND INCONCLUSIVE FINDINGS ON DIAGNOSTIC IMAGING OF BREAST: ICD-10-CM

## 2025-08-14 DIAGNOSIS — N64.52 NIPPLE DISCHARGE: ICD-10-CM

## 2025-08-14 PROCEDURE — 88305 TISSUE EXAM BY PATHOLOGIST: CPT | Mod: 26

## 2025-08-14 PROCEDURE — A9585: CPT

## 2025-08-14 PROCEDURE — 19085 BX BREAST 1ST LESION MR IMAG: CPT

## 2025-08-14 PROCEDURE — 77065 DX MAMMO INCL CAD UNI: CPT | Mod: 26,LT

## 2025-08-14 PROCEDURE — 19085 BX BREAST 1ST LESION MR IMAG: CPT | Mod: LT

## 2025-08-14 PROCEDURE — 19086 BX BREAST ADD LESION MR IMAG: CPT | Mod: RT

## 2025-08-14 PROCEDURE — 77065 DX MAMMO INCL CAD UNI: CPT

## 2025-08-14 PROCEDURE — A4648: CPT

## 2025-08-15 ENCOUNTER — APPOINTMENT (OUTPATIENT)
Dept: ULTRASOUND IMAGING | Facility: IMAGING CENTER | Age: 35
End: 2025-08-15
Payer: COMMERCIAL

## 2025-08-15 ENCOUNTER — OUTPATIENT (OUTPATIENT)
Dept: OUTPATIENT SERVICES | Facility: HOSPITAL | Age: 35
LOS: 1 days | End: 2025-08-15
Payer: COMMERCIAL

## 2025-08-15 DIAGNOSIS — Z00.8 ENCOUNTER FOR OTHER GENERAL EXAMINATION: ICD-10-CM

## 2025-08-15 PROCEDURE — 76642 ULTRASOUND BREAST LIMITED: CPT

## 2025-08-15 PROCEDURE — 76642 ULTRASOUND BREAST LIMITED: CPT | Mod: 26,RT

## 2025-08-27 ENCOUNTER — NON-APPOINTMENT (OUTPATIENT)
Age: 35
End: 2025-08-27

## 2025-09-02 ENCOUNTER — NON-APPOINTMENT (OUTPATIENT)
Age: 35
End: 2025-09-02

## 2025-09-02 ENCOUNTER — APPOINTMENT (OUTPATIENT)
Dept: SURGICAL ONCOLOGY | Facility: CLINIC | Age: 35
End: 2025-09-02
Payer: COMMERCIAL

## 2025-09-02 VITALS
RESPIRATION RATE: 18 BRPM | OXYGEN SATURATION: 97 % | DIASTOLIC BLOOD PRESSURE: 76 MMHG | WEIGHT: 162 LBS | HEIGHT: 65 IN | SYSTOLIC BLOOD PRESSURE: 119 MMHG | HEART RATE: 80 BPM | BODY MASS INDEX: 26.99 KG/M2

## 2025-09-02 DIAGNOSIS — D24.9 BENIGN NEOPLASM OF UNSPECIFIED BREAST: ICD-10-CM

## 2025-09-02 DIAGNOSIS — N60.19 DIFFUSE CYSTIC MASTOPATHY OF UNSPECIFIED BREAST: ICD-10-CM

## 2025-09-02 PROCEDURE — 99203 OFFICE O/P NEW LOW 30 MIN: CPT
